# Patient Record
Sex: FEMALE | Race: WHITE | NOT HISPANIC OR LATINO | URBAN - METROPOLITAN AREA
[De-identification: names, ages, dates, MRNs, and addresses within clinical notes are randomized per-mention and may not be internally consistent; named-entity substitution may affect disease eponyms.]

---

## 2024-05-06 ENCOUNTER — INPATIENT (INPATIENT)
Facility: HOSPITAL | Age: 89
LOS: 2 days | Discharge: NURSING, REHAB OR LONG-TERM CARE | End: 2024-05-09
Attending: STUDENT IN AN ORGANIZED HEALTH CARE EDUCATION/TRAINING PROGRAM | Admitting: INTERNAL MEDICINE
Payer: MEDICARE

## 2024-05-06 VITALS
RESPIRATION RATE: 19 BRPM | SYSTOLIC BLOOD PRESSURE: 172 MMHG | OXYGEN SATURATION: 96 % | TEMPERATURE: 99 F | DIASTOLIC BLOOD PRESSURE: 64 MMHG | HEART RATE: 91 BPM

## 2024-05-06 DIAGNOSIS — E03.9 HYPOTHYROIDISM, UNSPECIFIED: ICD-10-CM

## 2024-05-06 DIAGNOSIS — E78.5 HYPERLIPIDEMIA, UNSPECIFIED: ICD-10-CM

## 2024-05-06 DIAGNOSIS — I10 ESSENTIAL (PRIMARY) HYPERTENSION: ICD-10-CM

## 2024-05-06 DIAGNOSIS — F03.90 UNSPECIFIED DEMENTIA WITHOUT BEHAVIORAL DISTURBANCE: ICD-10-CM

## 2024-05-06 DIAGNOSIS — G92.8 OTHER TOXIC ENCEPHALOPATHY: ICD-10-CM

## 2024-05-06 DIAGNOSIS — Z29.9 ENCOUNTER FOR PROPHYLACTIC MEASURES, UNSPECIFIED: ICD-10-CM

## 2024-05-06 DIAGNOSIS — F10.10 ALCOHOL ABUSE, UNCOMPLICATED: ICD-10-CM
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
## 2024-05-06 LAB
ADD ON TEST-SPECIMEN IN LAB: SIGNIFICANT CHANGE UP
ALBUMIN SERPL ELPH-MCNC: 3.2 G/DL — LOW (ref 3.3–5)
ALBUMIN SERPL ELPH-MCNC: 4.1 G/DL — SIGNIFICANT CHANGE UP (ref 3.3–5)
ALP SERPL-CCNC: 73 U/L — SIGNIFICANT CHANGE UP (ref 40–120)
ALP SERPL-CCNC: 95 U/L — SIGNIFICANT CHANGE UP (ref 40–120)
ALT FLD-CCNC: 11 U/L — SIGNIFICANT CHANGE UP (ref 10–45)
ALT FLD-CCNC: SIGNIFICANT CHANGE UP U/L (ref 10–45)
AMPHET UR-MCNC: NEGATIVE — SIGNIFICANT CHANGE UP
ANION GAP SERPL CALC-SCNC: 11 MMOL/L — SIGNIFICANT CHANGE UP (ref 5–17)
ANION GAP SERPL CALC-SCNC: 15 MMOL/L — SIGNIFICANT CHANGE UP (ref 5–17)
ANISOCYTOSIS BLD QL: SLIGHT — SIGNIFICANT CHANGE UP
APAP SERPL-MCNC: 15 UG/ML — SIGNIFICANT CHANGE UP (ref 10–30)
APPEARANCE UR: CLEAR — SIGNIFICANT CHANGE UP
APTT BLD: 25.2 SEC — SIGNIFICANT CHANGE UP (ref 24.5–35.6)
AST SERPL-CCNC: 28 U/L — SIGNIFICANT CHANGE UP (ref 10–40)
AST SERPL-CCNC: SIGNIFICANT CHANGE UP U/L (ref 10–40)
BACTERIA # UR AUTO: ABNORMAL /HPF
BARBITURATES UR SCN-MCNC: NEGATIVE — SIGNIFICANT CHANGE UP
BASOPHILS # BLD AUTO: 0 K/UL — SIGNIFICANT CHANGE UP (ref 0–0.2)
BASOPHILS NFR BLD AUTO: 0 % — SIGNIFICANT CHANGE UP (ref 0–2)
BENZODIAZ UR-MCNC: NEGATIVE — SIGNIFICANT CHANGE UP
BILIRUB SERPL-MCNC: 0.5 MG/DL — SIGNIFICANT CHANGE UP (ref 0.2–1.2)
BILIRUB SERPL-MCNC: 0.7 MG/DL — SIGNIFICANT CHANGE UP (ref 0.2–1.2)
BILIRUB UR-MCNC: NEGATIVE — SIGNIFICANT CHANGE UP
BUN SERPL-MCNC: 20 MG/DL — SIGNIFICANT CHANGE UP (ref 7–23)
BUN SERPL-MCNC: 21 MG/DL — SIGNIFICANT CHANGE UP (ref 7–23)
CALCIUM SERPL-MCNC: 8.1 MG/DL — LOW (ref 8.4–10.5)
CALCIUM SERPL-MCNC: 9.4 MG/DL — SIGNIFICANT CHANGE UP (ref 8.4–10.5)
CAST: 0 /LPF — SIGNIFICANT CHANGE UP (ref 0–4)
CHLORIDE SERPL-SCNC: 103 MMOL/L — SIGNIFICANT CHANGE UP (ref 96–108)
CHLORIDE SERPL-SCNC: 98 MMOL/L — SIGNIFICANT CHANGE UP (ref 96–108)
CK MB CFR SERPL CALC: 9.9 NG/ML — HIGH (ref 0–6.7)
CK SERPL-CCNC: 338 U/L — HIGH (ref 25–170)
CK SERPL-CCNC: 695 U/L — HIGH (ref 25–170)
CO2 SERPL-SCNC: 18 MMOL/L — LOW (ref 22–31)
CO2 SERPL-SCNC: 21 MMOL/L — LOW (ref 22–31)
COCAINE METAB.OTHER UR-MCNC: NEGATIVE — SIGNIFICANT CHANGE UP
COLOR SPEC: YELLOW — SIGNIFICANT CHANGE UP
CREAT SERPL-MCNC: 1.04 MG/DL — SIGNIFICANT CHANGE UP (ref 0.5–1.3)
CREAT SERPL-MCNC: 1.17 MG/DL — SIGNIFICANT CHANGE UP (ref 0.5–1.3)
DIFF PNL FLD: ABNORMAL
EGFR: 44 ML/MIN/1.73M2 — LOW
EGFR: 51 ML/MIN/1.73M2 — LOW
EOSINOPHIL # BLD AUTO: 0 K/UL — SIGNIFICANT CHANGE UP (ref 0–0.5)
EOSINOPHIL NFR BLD AUTO: 0 % — SIGNIFICANT CHANGE UP (ref 0–6)
GLUCOSE BLDC GLUCOMTR-MCNC: 82 MG/DL — SIGNIFICANT CHANGE UP (ref 70–99)
GLUCOSE BLDC GLUCOMTR-MCNC: 96 MG/DL — SIGNIFICANT CHANGE UP (ref 70–99)
GLUCOSE SERPL-MCNC: 111 MG/DL — HIGH (ref 70–99)
GLUCOSE SERPL-MCNC: 141 MG/DL — HIGH (ref 70–99)
GLUCOSE UR QL: 100 MG/DL
HCT VFR BLD CALC: 41.7 % — SIGNIFICANT CHANGE UP (ref 34.5–45)
HGB BLD-MCNC: 13.8 G/DL — SIGNIFICANT CHANGE UP (ref 11.5–15.5)
INR BLD: 0.98 — SIGNIFICANT CHANGE UP (ref 0.85–1.18)
KETONES UR-MCNC: 15 MG/DL
LACTATE SERPL-SCNC: 1.4 MMOL/L — SIGNIFICANT CHANGE UP (ref 0.5–2)
LACTATE SERPL-SCNC: 2.5 MMOL/L — HIGH (ref 0.5–2)
LACTATE SERPL-SCNC: 2.8 MMOL/L — HIGH (ref 0.5–2)
LEUKOCYTE ESTERASE UR-ACNC: NEGATIVE — SIGNIFICANT CHANGE UP
LYMPHOCYTES # BLD AUTO: 0.09 K/UL — LOW (ref 1–3.3)
LYMPHOCYTES # BLD AUTO: 0.8 % — LOW (ref 13–44)
MACROCYTES BLD QL: SLIGHT — SIGNIFICANT CHANGE UP
MAGNESIUM SERPL-MCNC: 1.6 MG/DL — SIGNIFICANT CHANGE UP (ref 1.6–2.6)
MANUAL SMEAR VERIFICATION: SIGNIFICANT CHANGE UP
MCHC RBC-ENTMCNC: 30.9 PG — SIGNIFICANT CHANGE UP (ref 27–34)
MCHC RBC-ENTMCNC: 33.1 GM/DL — SIGNIFICANT CHANGE UP (ref 32–36)
MCV RBC AUTO: 93.3 FL — SIGNIFICANT CHANGE UP (ref 80–100)
METHADONE UR-MCNC: NEGATIVE — SIGNIFICANT CHANGE UP
MICROCYTES BLD QL: SLIGHT — SIGNIFICANT CHANGE UP
MONOCYTES # BLD AUTO: 0.1 K/UL — SIGNIFICANT CHANGE UP (ref 0–0.9)
MONOCYTES NFR BLD AUTO: 0.9 % — LOW (ref 2–14)
NEUTROPHILS # BLD AUTO: 10.98 K/UL — HIGH (ref 1.8–7.4)
NEUTROPHILS NFR BLD AUTO: 96.5 % — HIGH (ref 43–77)
NEUTS BAND # BLD: 0.9 % — SIGNIFICANT CHANGE UP (ref 0–8)
NITRITE UR-MCNC: POSITIVE
OPIATES UR-MCNC: NEGATIVE — SIGNIFICANT CHANGE UP
OVALOCYTES BLD QL SMEAR: SLIGHT — SIGNIFICANT CHANGE UP
PCP SPEC-MCNC: SIGNIFICANT CHANGE UP
PCP UR-MCNC: NEGATIVE — SIGNIFICANT CHANGE UP
PH UR: 7.5 — SIGNIFICANT CHANGE UP (ref 5–8)
PHOSPHATE SERPL-MCNC: 3.4 MG/DL — SIGNIFICANT CHANGE UP (ref 2.5–4.5)
PLAT MORPH BLD: ABNORMAL
PLATELET # BLD AUTO: 169 K/UL — SIGNIFICANT CHANGE UP (ref 150–400)
POLYCHROMASIA BLD QL SMEAR: SLIGHT — SIGNIFICANT CHANGE UP
POTASSIUM SERPL-MCNC: 4.2 MMOL/L — SIGNIFICANT CHANGE UP (ref 3.5–5.3)
POTASSIUM SERPL-MCNC: 4.4 MMOL/L — SIGNIFICANT CHANGE UP (ref 3.5–5.3)
POTASSIUM SERPL-MCNC: SIGNIFICANT CHANGE UP MMOL/L (ref 3.5–5.3)
POTASSIUM SERPL-SCNC: 4.2 MMOL/L — SIGNIFICANT CHANGE UP (ref 3.5–5.3)
POTASSIUM SERPL-SCNC: 4.4 MMOL/L — SIGNIFICANT CHANGE UP (ref 3.5–5.3)
POTASSIUM SERPL-SCNC: SIGNIFICANT CHANGE UP MMOL/L (ref 3.5–5.3)
PROCALCITONIN SERPL-MCNC: 0.05 NG/ML — SIGNIFICANT CHANGE UP (ref 0.02–0.1)
PROT SERPL-MCNC: 5.3 G/DL — LOW (ref 6–8.3)
PROT SERPL-MCNC: 6.8 G/DL — SIGNIFICANT CHANGE UP (ref 6–8.3)
PROT UR-MCNC: 300 MG/DL
PROTHROM AB SERPL-ACNC: 11.2 SEC — SIGNIFICANT CHANGE UP (ref 9.5–13)
RBC # BLD: 4.47 M/UL — SIGNIFICANT CHANGE UP (ref 3.8–5.2)
RBC # FLD: 12.6 % — SIGNIFICANT CHANGE UP (ref 10.3–14.5)
RBC BLD AUTO: ABNORMAL
RBC CASTS # UR COMP ASSIST: 4 /HPF — SIGNIFICANT CHANGE UP (ref 0–4)
SALICYLATES SERPL-MCNC: <0.3 MG/DL — LOW (ref 2.8–20)
SODIUM SERPL-SCNC: 132 MMOL/L — LOW (ref 135–145)
SODIUM SERPL-SCNC: 134 MMOL/L — LOW (ref 135–145)
SP GR SPEC: 1.02 — SIGNIFICANT CHANGE UP (ref 1–1.03)
SPHEROCYTES BLD QL SMEAR: SLIGHT — SIGNIFICANT CHANGE UP
SQUAMOUS # UR AUTO: 0 /HPF — SIGNIFICANT CHANGE UP (ref 0–5)
THC UR QL: NEGATIVE — SIGNIFICANT CHANGE UP
TROPONIN T, HIGH SENSITIVITY RESULT: 51 NG/L — SIGNIFICANT CHANGE UP (ref 0–51)
TROPONIN T, HIGH SENSITIVITY RESULT: 70 NG/L — CRITICAL HIGH (ref 0–51)
TSH SERPL-MCNC: 1.34 UIU/ML — SIGNIFICANT CHANGE UP (ref 0.27–4.2)
UROBILINOGEN FLD QL: 1 MG/DL — SIGNIFICANT CHANGE UP (ref 0.2–1)
VARIANT LYMPHS # BLD: 0.9 % — SIGNIFICANT CHANGE UP (ref 0–6)
WBC # BLD: 11.27 K/UL — HIGH (ref 3.8–10.5)
WBC # FLD AUTO: 11.27 K/UL — HIGH (ref 3.8–10.5)
WBC UR QL: 2 /HPF — SIGNIFICANT CHANGE UP (ref 0–5)

## 2024-05-06 PROCEDURE — 70450 CT HEAD/BRAIN W/O DYE: CPT | Mod: 26,MC

## 2024-05-06 PROCEDURE — 99285 EMERGENCY DEPT VISIT HI MDM: CPT

## 2024-05-06 PROCEDURE — 93010 ELECTROCARDIOGRAM REPORT: CPT

## 2024-05-06 PROCEDURE — 71045 X-RAY EXAM CHEST 1 VIEW: CPT | Mod: 26

## 2024-05-06 PROCEDURE — 72125 CT NECK SPINE W/O DYE: CPT | Mod: 26,MC

## 2024-05-06 PROCEDURE — 99223 1ST HOSP IP/OBS HIGH 75: CPT | Mod: GC

## 2024-05-06 RX ORDER — CEFTRIAXONE 500 MG/1
1000 INJECTION, POWDER, FOR SOLUTION INTRAMUSCULAR; INTRAVENOUS ONCE
Refills: 0 | Status: COMPLETED | OUTPATIENT
Start: 2024-05-06 | End: 2024-05-06

## 2024-05-06 RX ORDER — SIMVASTATIN 20 MG/1
1 TABLET, FILM COATED ORAL
Refills: 0 | DISCHARGE

## 2024-05-06 RX ORDER — THIAMINE MONONITRATE (VIT B1) 100 MG
500 TABLET ORAL EVERY 8 HOURS
Refills: 0 | Status: DISCONTINUED | OUTPATIENT
Start: 2024-05-06 | End: 2024-05-08

## 2024-05-06 RX ORDER — HEPARIN SODIUM 5000 [USP'U]/ML
5000 INJECTION INTRAVENOUS; SUBCUTANEOUS EVERY 8 HOURS
Refills: 0 | Status: DISCONTINUED | OUTPATIENT
Start: 2024-05-06 | End: 2024-05-09

## 2024-05-06 RX ORDER — SODIUM CHLORIDE 9 MG/ML
600 INJECTION INTRAMUSCULAR; INTRAVENOUS; SUBCUTANEOUS
Refills: 0 | Status: DISCONTINUED | OUTPATIENT
Start: 2024-05-06 | End: 2024-05-07

## 2024-05-06 RX ORDER — ACETAMINOPHEN 500 MG
1000 TABLET ORAL ONCE
Refills: 0 | Status: COMPLETED | OUTPATIENT
Start: 2024-05-06 | End: 2024-05-06

## 2024-05-06 RX ORDER — ATENOLOL 25 MG/1
1 TABLET ORAL
Refills: 0 | DISCHARGE

## 2024-05-06 RX ORDER — POTASSIUM CHLORIDE 20 MEQ
10 PACKET (EA) ORAL ONCE
Refills: 0 | Status: DISCONTINUED | OUTPATIENT
Start: 2024-05-06 | End: 2024-05-06

## 2024-05-06 RX ORDER — SODIUM CHLORIDE 9 MG/ML
1000 INJECTION, SOLUTION INTRAVENOUS
Refills: 0 | Status: DISCONTINUED | OUTPATIENT
Start: 2024-05-06 | End: 2024-05-09

## 2024-05-06 RX ORDER — SODIUM CHLORIDE 9 MG/ML
1000 INJECTION INTRAMUSCULAR; INTRAVENOUS; SUBCUTANEOUS ONCE
Refills: 0 | Status: COMPLETED | OUTPATIENT
Start: 2024-05-06 | End: 2024-05-06

## 2024-05-06 RX ORDER — INSULIN LISPRO 100/ML
VIAL (ML) SUBCUTANEOUS
Refills: 0 | Status: DISCONTINUED | OUTPATIENT
Start: 2024-05-06 | End: 2024-05-09

## 2024-05-06 RX ORDER — DEXTROSE 50 % IN WATER 50 %
25 SYRINGE (ML) INTRAVENOUS ONCE
Refills: 0 | Status: DISCONTINUED | OUTPATIENT
Start: 2024-05-06 | End: 2024-05-09

## 2024-05-06 RX ORDER — DEXTROSE 10 % IN WATER 10 %
125 INTRAVENOUS SOLUTION INTRAVENOUS ONCE
Refills: 0 | Status: DISCONTINUED | OUTPATIENT
Start: 2024-05-06 | End: 2024-05-09

## 2024-05-06 RX ORDER — LEVOTHYROXINE SODIUM 125 MCG
1 TABLET ORAL
Refills: 0 | DISCHARGE

## 2024-05-06 RX ORDER — DEXTROSE 50 % IN WATER 50 %
15 SYRINGE (ML) INTRAVENOUS ONCE
Refills: 0 | Status: DISCONTINUED | OUTPATIENT
Start: 2024-05-06 | End: 2024-05-09

## 2024-05-06 RX ORDER — LOSARTAN/HYDROCHLOROTHIAZIDE 100MG-25MG
1 TABLET ORAL
Refills: 0 | DISCHARGE

## 2024-05-06 RX ORDER — GLUCAGON INJECTION, SOLUTION 0.5 MG/.1ML
1 INJECTION, SOLUTION SUBCUTANEOUS ONCE
Refills: 0 | Status: DISCONTINUED | OUTPATIENT
Start: 2024-05-06 | End: 2024-05-09

## 2024-05-06 RX ORDER — LEVOTHYROXINE SODIUM 125 MCG
80 TABLET ORAL AT BEDTIME
Refills: 0 | Status: DISCONTINUED | OUTPATIENT
Start: 2024-05-06 | End: 2024-05-07

## 2024-05-06 RX ORDER — INSULIN LISPRO 100/ML
VIAL (ML) SUBCUTANEOUS AT BEDTIME
Refills: 0 | Status: DISCONTINUED | OUTPATIENT
Start: 2024-05-06 | End: 2024-05-09

## 2024-05-06 RX ORDER — DEXTROSE 50 % IN WATER 50 %
12.5 SYRINGE (ML) INTRAVENOUS ONCE
Refills: 0 | Status: DISCONTINUED | OUTPATIENT
Start: 2024-05-06 | End: 2024-05-09

## 2024-05-06 RX ADMIN — SODIUM CHLORIDE 75 MILLILITER(S): 9 INJECTION INTRAMUSCULAR; INTRAVENOUS; SUBCUTANEOUS at 16:59

## 2024-05-06 RX ADMIN — SODIUM CHLORIDE 1000 MILLILITER(S): 9 INJECTION INTRAMUSCULAR; INTRAVENOUS; SUBCUTANEOUS at 11:59

## 2024-05-06 RX ADMIN — Medication 80 MICROGRAM(S): at 21:34

## 2024-05-06 RX ADMIN — CEFTRIAXONE 100 MILLIGRAM(S): 500 INJECTION, POWDER, FOR SOLUTION INTRAMUSCULAR; INTRAVENOUS at 11:59

## 2024-05-06 RX ADMIN — SODIUM CHLORIDE 1000 MILLILITER(S): 9 INJECTION INTRAMUSCULAR; INTRAVENOUS; SUBCUTANEOUS at 12:00

## 2024-05-06 RX ADMIN — HEPARIN SODIUM 5000 UNIT(S): 5000 INJECTION INTRAVENOUS; SUBCUTANEOUS at 21:29

## 2024-05-06 RX ADMIN — Medication 105 MILLIGRAM(S): at 17:40

## 2024-05-06 RX ADMIN — Medication 400 MILLIGRAM(S): at 12:36

## 2024-05-06 NOTE — H&P ADULT - HISTORY OF PRESENT ILLNESS
89F w/ PMHx of Dementia, hypothyroidism, HTN, HLD was BIBEMS after being found down, admitted for toxic metabolic encephalopathy.     ED Course:  Vitals: 99.9 F, HR 91, /64, RR 19 (96%) on Room air  Labs: WBC 11.27, Neutrophils 96%, Na 134, BUN 21, Glucose 141, Lactate 2.5, , U/A + bacteria, leuk esterase, 2 WBC, protein, ketones,   Imaging: CXR unremarkable. CTH Parenchymal volume loss and chronic microvessel ischemic changes are identified, Dilated lateral and third ventricles are seen with a normal-appearing fourth ventricle. 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 Collateral obtained from her POA (Armaan Machuca Jr , Esq [phone]) and PCP Dr. Perry ([phone]). Patient had a telephonic visit this past weekend with her PCP and at that time, she was at her baseline (AAOx2-3 and functional). She subsequently had a fall at sometime on 05/05 and was found down by her assistant this morning, which prompted the ED visit. Per POA, patient has recently become more confused and often repeats the same stories for the past few weeks. Of note, the assistant has noticed multiple empty wine bottles in the garbage. Per POA, she ordered 12 bottles of wine x2 in the past 2 weeks. No known history of withdrawal, DT, or seizures. ROS limited by clinical status.    ED Course:  Vitals: 99.9 F, HR 91, /64, RR 19 (96%) on Room air  Labs: WBC 11.27, Neutrophils 96%, Na 134, BUN 21, Glucose 141, Lactate 2.5, , U/A + bacteria, leuk esterase, 2 WBC, protein, ketones,   Imaging: CXR unremarkable. CTH Parenchymal volume loss and chronic microvessel ischemic changes are identified, Dilated lateral and third ventricles are seen with a normal-appearing fourth ventricle. 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

## 2024-05-06 NOTE — H&P ADULT - PROBLEM SELECTOR PLAN 1
Likely 2/2 sepsis from ?UTI vs. NPH vs. ETOH abuse vs. metabolic derangements.  - Obtain TSH, B12, Ammonia, Tylenol, ASA levels  - Obtain Utox Likely 2/2 sepsis from ?UTI vs. NPH vs. ETOH abuse vs. metabolic derangements. S/p CTX in ED   - Obtain TSH, B12, Ammonia, Tylenol, ASA levels  - Obtain Utox Presents after being found down over the weekend, found this morning. On admission, did not meet SIRS criteria. U/A + bacteria, leuk esterase, 2 WBC, protein, ketones. . S/p CTX and 2L NS. Lactate 2.5. Likely 2/2 ?UTI vs. ETOH abuse vs. progressive dementia vs. NPH vs. metabolic derangements. 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 CT cervical - degenerative changes  - Obtain Neuro consult  - Obtain Utox, TSH, B12, Ammonia, Tylenol, ASA levels  - C/w CTX 2g x 3 days total (-05/08)  - F/u BCx and UCx Presents after being found down over the weekend, found this morning. On admission, did not meet SIRS criteria. U/A + bacteria, leuk esterase, 2 WBC, protein, ketones. . S/p CTX and 2L NS. Lactate 2.5. Likely 2/2 ETOH abuse vs. progressive dementia vs. NPH vs. metabolic derangements. 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 CT cervical - degenerative changes  - Obtain Neuro consult  - Obtain Utox, TSH, B12, Ammonia, Tylenol, ASA levels  - Monitor off Abx as U/A with bacteria   - F/u BCx and UCx Presents after being found down over the weekend, found this morning. On admission, did not meet SIRS criteria. U/A + bacteria, leuk esterase, 2 WBC, protein, ketones. . S/p CTX and 2L NS. Lactate 2.5. Likely 2/2 ETOH abuse vs. progressive dementia vs. NPH vs. metabolic derangements. 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 CT cervical - degenerative changes  - Obtain Neuro consult  - Obtain Utox, TSH, B12, Ammonia, Tylenol, ASA levels  - Monitor off Abx as U/A with bacteria   - F/u BCx and UCx  - Obtain vEEG to rule out seizures  - Obtain MRI w/w/o contrast Presents after being found down over the weekend, found this morning. On admission, did not meet SIRS criteria. U/A + bacteria, leuk esterase, 2 WBC, protein, ketones. . S/p CTX and 2L NS. Lactate 2.5. Likely 2/2 ETOH abuse vs. progressive dementia vs. NPH vs. metabolic derangements. 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 CT cervical - degenerative changes  - Obtain Neuro consult, appreciate recs  - Obtain Utox, TSH, B12, Ammonia, Tylenol, ASA levels  - Monitor off Abx as U/A without pyruria  - F/u BCx and UCx  - Obtain vEEG to rule out seizures  - Obtain MRI w/w/o contrast

## 2024-05-06 NOTE — H&P ADULT - PROBLEM SELECTOR PLAN 6
Plan:  F: s/p 2L NS in the ED   E: replete K<4, Mg<2  N: NPO  VTE Prophylaxis: Heparin SubQ  C: Full Code  D: KAYLA Known history of hypothyroidism. Per Surescripts, on Synthroid 100mcg  - C/w Synthroid 100mcg Known history of hypothyroidism. On home Synthroid 100mcg  - C/w Synthroid 100mcg Known history of HLD. On home Simvastatin 40mg  - C/w therapeutic interchange Lipitor

## 2024-05-06 NOTE — ED PROVIDER NOTE - PHYSICAL EXAMINATION
VITAL SIGNS: I have reviewed nursing notes and confirm.  CONSTITUTIONAL: weak appearing  SKIN: abrasion left face  HEAD: Normocephalic; atraumatic.  EYES: PERRL, EOM intact; conjunctiva and sclera clear.  ENT: No nasal discharge; airway clear.  NECK: Supple; non tender.  CARD: S1, S2 normal; no murmurs, gallops, or rubs. Regular rate and rhythm.  RESP: No wheezes, rales or rhonchi.  ABD: Normal bowel sounds; soft; non-distended; non-tender; no hepatosplenomegaly.  EXT: Normal ROM. No clubbing, cyanosis or edema.  LYMPH: No acute cervical adenopathy.  NEURO: Awake and alert, moving all ext, not following commands  PSYCH: Cooperative

## 2024-05-06 NOTE — ED ADULT NURSE NOTE - NSFALLHARMRISKINTERV_ED_ALL_ED

## 2024-05-06 NOTE — ED ADULT NURSE NOTE - OBJECTIVE STATEMENT
89yoF PMH HTN, HLD, BIBEMS c/o AMS. HHA states she came today and found patient on floor covered in Urine. Bruise noted around L eye. Pt arrived AOX0, non-verbal, responds to pain. A states pt is normally AOX4, able to take care of herself at home. Last seen on Friday. Pt cleaned, EKG performed, pt placed on CCM. Pt upgraded to MD Zarate. MD at bedside to assess the patient. IV placed, labs sent as ordered. Rectal temp checked.

## 2024-05-06 NOTE — H&P ADULT - PROBLEM SELECTOR PLAN 3
On admission, presented with /64. Repeat 120/60. Per Surescript, on home HCTZ-Losartan 100mg-12.5mg, Atenolol 50mg, and Lasix 40mg PO   - c/w home HCTZ-Losartan 100mg-12.5mg and Atenolol 50mg   - Hold Lasix 40mg PO for now as patient is currently euvolemic and script was filled remotely On admission, presented with /64. Repeat 120/60. On home HCTZ-Losartan 100mg-12.5mg, Atenolol 50mg, and Lasix 40mg PO   - c/w home HCTZ-Losartan 100mg-12.5mg and Atenolol 50mg   - Hold Lasix 40mg PO for now as patient is currently euvolemic, restart as clinically indicated Patient with history of multiple falls in the past few months. Patient was found down after a suspected fall this weekend. Last known well on Saturday.  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 CT cervical - degenerative changes. Likely 2/2 progressive dementia vs. ETOH use. vs. NPH.   - PT consult  - Obtain orthostatics  - Pain management: Tylenol 650mg q6 IV PRN, lidocaine patch  - Weight bearing/Fall precautions

## 2024-05-06 NOTE — H&P ADULT - PROBLEM SELECTOR PLAN 7
Plan:  F: s/p 2L NS in the ED   E: replete K<4, Mg<2  N: NPO  VTE Prophylaxis: Heparin SubQ  C: Full Code  D: KAYLA Known history of hypothyroidism. On home Synthroid 100mcg  - C/w Synthroid 100mcg

## 2024-05-06 NOTE — H&P ADULT - PROBLEM SELECTOR PLAN 8
Plan:  F: s/p 2L NS in the ED   E: replete K<4, Mg<2  N: NPO  VTE Prophylaxis: Heparin SubQ  C: Full Code  D: KAYLA

## 2024-05-06 NOTE — H&P ADULT - NSHPSOCIALHISTORY_GEN_ALL_CORE
Lives alone in an apartment. Has assistants that visit her periodically. Recent significant ETOH use (per POA, she ordered 2 crates of wine (12 bottles each) within the last 2 weeks). No HHA.

## 2024-05-06 NOTE — H&P ADULT - PROBLEM SELECTOR PLAN 2
HCTZ-Losartan 100-12.5mg. Lasix 40mg PO. Atenolol 50mg Patient with recent increase in ETOH consumption per POA. Home frequently seen to have empty bottles of ETOH in her garbage during routine check-ins. Furthermore, she may be masking amount of ETOH consumption from assistant personel who routinely checks on her. No known history of DTs or seizures.   - CIWA cannot be performed due to patient's current mental status.  - Last drink: unknown  - c/w CIWA protocol, ativan 2mg PRN for CIWA > 8 p  - give thiamine 909m5cod for 3 days, then 250mg daily  - multivitamin and folic acid daily when able to tolerate PO  - monitor EKG with QT prolonging meds  - fall precautions

## 2024-05-06 NOTE — CONSULT NOTE ADULT - ATTENDING COMMENTS
89-year-old woman with history of documented alcohol misuse admitted with altered mental status in the setting of a possible UTI treated with antibiotics with improving mental status.  CT head shows nonspecific periventricular hypodensity likely cerebral small vessel disease with global atrophy and ex vacuo dilation of ventricles, unlikely communicating hydrocephalus.  EEG demonstrated triphasic slowing with improvement.  MRI brain recommended but can likely be done on an outpatient basis with neurology follow-up.  Screening B12, homocystine, methylmalonic acid B1, TSH, RPR and HIV recommended.

## 2024-05-06 NOTE — H&P ADULT - NSHPPHYSICALEXAM_GEN_ALL_CORE
T(C): 37.7 (05-06-24 @ 10:30), Max: 37.7 (05-06-24 @ 10:30)  HR: 77 (05-06-24 @ 11:30) (77 - 91)  BP: 127/70 (05-06-24 @ 11:30) (127/70 - 172/64)  RR: 18 (05-06-24 @ 11:30) (18 - 19)  SpO2: 96% (05-06-24 @ 11:30) (96% - 96%)    General: NAD, laying in bed, speaking in full sentences  HEENT: head NC/AT, no conjunctival injection, EOMI, MMM  Neck: supple, no JVD  Cardio: RRR, +S1/S2, no M/R/G  Resp: lungs CTAB, no cough/wheezes/rales/rhonchi  Abdo: soft, NT, ND, +bowel sounds x4, no organomegaly or palpable mass    Extremities: WWP, no edema/cyanosis/clubbing   Vasc: 2+ radial and DP pulses b/l  Neuro: A&Ox3  Psych: speech non-pressured, thoughts goal-oriented  Skin: dry, intact, no visible jaundice   MSK: no joint swelling T(C): 37.7 (05-06-24 @ 10:30), Max: 37.7 (05-06-24 @ 10:30)  HR: 77 (05-06-24 @ 11:30) (77 - 91)  BP: 127/70 (05-06-24 @ 11:30) (127/70 - 172/64)  RR: 18 (05-06-24 @ 11:30) (18 - 19)  SpO2: 96% (05-06-24 @ 11:30) (96% - 96%)    General: NAD, laying in bed,   HEENT: head NC/AT, no conjunctival injection, EOMI, MMM  Neck: supple, no JVD  Cardio: RRR, +S1/S2, no M/R/G  Resp: lungs CTAB, no cough/wheezes/rales/rhonchi  Abdo: soft, NT, ND, +bowel sounds x4, no organomegaly or palpable mass    Extremities: WWP, no edema/cyanosis/clubbing   Vasc: 2+ radial and DP pulses b/l  Neuro: A&Ox3  Psych: speech non-pressured, thoughts goal-oriented  Skin: dry, intact, no visible jaundice   MSK: no joint swelling T(C): 37.7 (05-06-24 @ 10:30), Max: 37.7 (05-06-24 @ 10:30)  HR: 77 (05-06-24 @ 11:30) (77 - 91)  BP: 127/70 (05-06-24 @ 11:30) (127/70 - 172/64)  RR: 18 (05-06-24 @ 11:30) (18 - 19)  SpO2: 96% (05-06-24 @ 11:30) (96% - 96%)    General: NAD, laying in bed,   HEENT: head NC/AT, no conjunctival injection, EOMI, MMM  Neck: supple, no JVD  Cardio: RRR, +S1/S2, no M/R/G  Resp: lungs CTAB, no cough/wheezes/rales/rhonchi, on room air  Abdo: soft, NT, ND, +bowel sounds x4, no organomegaly or palpable mass    Extremities: +thin LE with vascular changes  Vasc: 2+ radial and DP pulses b/l  Neuro: A&Ox0, opens eyes but unable to track movement meaningfully. Spontaneously moves all extremities and winces to noxious stimuli  Psych: speech non-pressured, thoughts goal-oriented  Skin: dry, intact, no visible jaundice   MSK: no joint swelling

## 2024-05-06 NOTE — H&P ADULT - ASSESSMENT
89F w/ PMHx of Dementia c/b frequent falls, hypothyroidism, HTN, HLD was BIBEMS after being found down, admitted for toxic metabolic encephalopathy.

## 2024-05-06 NOTE — ED ADULT TRIAGE NOTE - CHIEF COMPLAINT QUOTE
Pt BIBEMS from home. Per EMS, pt was found down by  today. Per EMS,  last saw patient on Saturday and reports pt is typically A&Ox4. Pt nonverbal in triage with bruising to left cheek. No other obvious deformities noted. EMS reports pt was found laying in urine. Hx HTN, HLD, hypothyroidism. Upgraded to MD Zarate. Pt BIBEMS from home. Per EMS, pt was found down by  today. Per EMS,  last saw patient on Saturday and reports pt is typically A&Ox4 and lives at home alone. Pt nonverbal in triage with bruising to left cheek. No other obvious deformities noted. EMS reports pt was found laying in urine. Hx HTN, HLD, hypothyroidism. Upgraded to MD Zarate. Pt BIBEMS from home. Per EMS, pt was found down by  today. Per EMS,  last saw patient on Saturday and reports pt is typically A&Ox4 and lives at home alone. Pt nonverbal in triage and unable to follow commands. Pt has bruising to left cheek. No other obvious deformities noted. EMS reports pt was found laying in urine. Hx HTN, HLD, hypothyroidism. Upgraded to MD Zarate.

## 2024-05-06 NOTE — ED ADULT NURSE NOTE - CHIEF COMPLAINT QUOTE
Pt BIBEMS from home. Per EMS, pt was found down by  today. Per EMS,  last saw patient on Saturday and reports pt is typically A&Ox4 and lives at home alone. Pt nonverbal in triage and unable to follow commands. Pt has bruising to left cheek. No other obvious deformities noted. EMS reports pt was found laying in urine. Hx HTN, HLD, hypothyroidism. Upgraded to MD Zarate.

## 2024-05-06 NOTE — PATIENT PROFILE ADULT - TRANSPORTATION
Pt reports swelling and white coating to tongue since completing antibiotics  Med to pharmacy    
no

## 2024-05-06 NOTE — ED PROVIDER NOTE - OBJECTIVE STATEMENT
89 y/o f with PMH of HTN, HLD, hypothyroid presents to ED with altered mental status.  As per  and  at bedside, pt was last normal day prior to presentation.   found pt on floor this morning.  Pt awake and alert but not able to answer most questions and follow commands.  Pt also has power of  and papers faxed.  No known AC.  Pt normally lives by herself and cares for self.   has noticed some mild dementia

## 2024-05-06 NOTE — H&P ADULT - PROBLEM SELECTOR PLAN 5
Known history of hypothyroidism. Per Surescripts, on Synthroid 100mcg  - C/w Synthroid 100mcg Known history of HLD. Per Surescripts, on Simvastatin 40mg  - C/w therapeutic interchange Lipitor Known history of HLD. On home Simvastatin 40mg  - C/w therapeutic interchange Lipitor Known history of dementia. Per POA (Armaan Machuca Jr), patient is conversive and can ambulate, however has slowed movements and takes time to execute plans (going to the bathroom, kitchen, etc). Recently, she has become more forgetful  - Aspiration and fall precautions  - Speech and Swallow evaluation  -  consult for HHA

## 2024-05-06 NOTE — H&P ADULT - ATTENDING COMMENTS
Pt is 89 yo woman with history of AUD, falls, dementia, admitted after being found down at her apartment. Pt CT head showed cortical atrophy and enlarged ventricles. Pt was hemodynamically stable on admission. Noted to have mild elevation of CPKs. On PE pt was alert but disoriented, not being able to follow commands. Noted to have rigidity in the upper extremities. Pupils are reactive. Cardiopulmonary exam is unremarkable. Abdomen is soft with diminished BS.   Impression: AMS, might be due to subacute stroke, vs alcohol withdrawal, vs seizures vs metabolic encephalopathy  ----neuro consult  ----MRI brain, might consider LP if other work up is non-diagnostic  ----thiamine supplementation IV  ----Prolactin levels and EEG  ----GOC conversation with HCP  ----withdrawal precautions

## 2024-05-06 NOTE — PATIENT PROFILE ADULT - FALL HARM RISK - HARM RISK INTERVENTIONS
Assistance with ambulation/Assistance OOB with selected safe patient handling equipment/Communicate Risk of Fall with Harm to all staff/Discuss with provider need for PT consult/Monitor for mental status changes/Monitor gait and stability/Move patient closer to nurses' station/Reinforce activity limits and safety measures with patient and family/Reorient to person, place and time as needed/Tailored Fall Risk Interventions/Toileting schedule using arm’s reach rule for commode and bathroom/Use of alarms - bed, chair and/or voice tab/Visual Cue: Yellow wristband and red socks/Bed in lowest position, wheels locked, appropriate side rails in place/Call bell, personal items and telephone in reach/Instruct patient to call for assistance before getting out of bed or chair/Non-slip footwear when patient is out of bed/Joy to call system/Physically safe environment - no spills, clutter or unnecessary equipment/Purposeful Proactive Rounding/Room/bathroom lighting operational, light cord in reach

## 2024-05-06 NOTE — ED PROVIDER NOTE - CLINICAL SUMMARY MEDICAL DECISION MAKING FREE TEXT BOX
89 y/o f with PMH of HTN, HLD, hypothyroid presents to ED with altered mental status.  As per  and  at bedside, pt was last normal day prior to presentation.   found pt on floor this morning.  Pt awake and alert but not able to answer most questions and follow commands.  Pt also has power of  and papers faxed.  No known AC.  Pt normally lives by herself and cares for self.   has noticed some mild dementia    VS - temp 99.9  Labs - wbc 11, UA positive for UTI  Treated with fluids - lactate 2.5, IV ceft in ED  TO be admitted for ams, UTI and further mgmt

## 2024-05-06 NOTE — H&P ADULT - PROBLEM SELECTOR PLAN 4
Known history of HLD. Per Surescripts, on Simvastatin 40mg  - C/w therapeutic interchange Lipitor Known history of dementia. Per POA (Armaan Machuca Jr), patient is conversive and can ambulate, however has slowed movements and takes time to execute plans (going to the bathroom, kitchen, etc). Recently, she has become more forgetful  - Aspiration and fall precautions  - Speech and Swallow evaluation  -  consult for HHA On admission, presented with /64. Repeat 120/60. On home HCTZ-Losartan 100mg-12.5mg, Atenolol 50mg, and Lasix 40mg PO   - c/w home HCTZ-Losartan 100mg-12.5mg and Atenolol 50mg   - Hold Lasix 40mg PO for now as patient is currently euvolemic, restart as clinically indicated

## 2024-05-06 NOTE — H&P ADULT - NSHPLABSRESULTS_GEN_ALL_CORE
LABS:                        13.8   11.27 )-----------( 169      ( 06 May 2024 10:18 )             41.7     05-    134<L>  |  98  |  20  ----------------------------<  141<H>  see note   |  21<L>  |  1.04    Ca    9.4      06 May 2024 10:18    TPro  6.8  /  Alb  4.1  /  TBili  0.7  /  DBili  x   /  AST  see note  /  ALT  see note  /  AlkPhos  95  -06    PT/INR - ( 06 May 2024 10:18 )   PT: 11.2 sec;   INR: 0.98          PTT - ( 06 May 2024 10:18 )  PTT:25.2 sec  Urinalysis Basic - ( 06 May 2024 10:18 )    Color: Yellow / Appearance: Clear / S.016 / pH: x  Gluc: 141 mg/dL / Ketone: 15 mg/dL  / Bili: Negative / Urobili: 1.0 mg/dL   Blood: x / Protein: 300 mg/dL / Nitrite: Positive   Leuk Esterase: Negative / RBC: 4 /HPF / WBC 2 /HPF   Sq Epi: x / Non Sq Epi: 0 /HPF / Bacteria: Many /HPF        MICROBIOLOGY:    RADIOLOGY & ADDITIONAL STUDIES:

## 2024-05-07 DIAGNOSIS — G93.41 METABOLIC ENCEPHALOPATHY: ICD-10-CM

## 2024-05-07 DIAGNOSIS — R79.89 OTHER SPECIFIED ABNORMAL FINDINGS OF BLOOD CHEMISTRY: ICD-10-CM

## 2024-05-07 DIAGNOSIS — N17.9 ACUTE KIDNEY FAILURE, UNSPECIFIED: ICD-10-CM

## 2024-05-07 LAB
ALBUMIN SERPL ELPH-MCNC: 3.5 G/DL — SIGNIFICANT CHANGE UP (ref 3.3–5)
ALP SERPL-CCNC: 80 U/L — SIGNIFICANT CHANGE UP (ref 40–120)
ALT FLD-CCNC: 15 U/L — SIGNIFICANT CHANGE UP (ref 10–45)
ANION GAP SERPL CALC-SCNC: 12 MMOL/L — SIGNIFICANT CHANGE UP (ref 5–17)
APPEARANCE UR: CLEAR — SIGNIFICANT CHANGE UP
APTT BLD: 29 SEC — SIGNIFICANT CHANGE UP (ref 24.5–35.6)
AST SERPL-CCNC: 38 U/L — SIGNIFICANT CHANGE UP (ref 10–40)
BACTERIA # UR AUTO: NEGATIVE /HPF — SIGNIFICANT CHANGE UP
BASOPHILS # BLD AUTO: 0.03 K/UL — SIGNIFICANT CHANGE UP (ref 0–0.2)
BASOPHILS NFR BLD AUTO: 0.3 % — SIGNIFICANT CHANGE UP (ref 0–2)
BILIRUB SERPL-MCNC: 0.7 MG/DL — SIGNIFICANT CHANGE UP (ref 0.2–1.2)
BILIRUB UR-MCNC: NEGATIVE — SIGNIFICANT CHANGE UP
BLD GP AB SCN SERPL QL: NEGATIVE — SIGNIFICANT CHANGE UP
BUN SERPL-MCNC: 23 MG/DL — SIGNIFICANT CHANGE UP (ref 7–23)
CALCIUM SERPL-MCNC: 8.8 MG/DL — SIGNIFICANT CHANGE UP (ref 8.4–10.5)
CAST: 1 /LPF — SIGNIFICANT CHANGE UP (ref 0–4)
CHLORIDE SERPL-SCNC: 107 MMOL/L — SIGNIFICANT CHANGE UP (ref 96–108)
CK MB CFR SERPL CALC: 16.2 NG/ML — HIGH (ref 0–6.7)
CK MB CFR SERPL CALC: 16.3 NG/ML — HIGH (ref 0–6.7)
CK SERPL-CCNC: 854 U/L — HIGH (ref 25–170)
CK SERPL-CCNC: 876 U/L — HIGH (ref 25–170)
CO2 SERPL-SCNC: 19 MMOL/L — LOW (ref 22–31)
COLOR SPEC: YELLOW — SIGNIFICANT CHANGE UP
CREAT ?TM UR-MCNC: 45 MG/DL — SIGNIFICANT CHANGE UP
CREAT SERPL-MCNC: 1.37 MG/DL — HIGH (ref 0.5–1.3)
DIFF PNL FLD: NEGATIVE — SIGNIFICANT CHANGE UP
EGFR: 36 ML/MIN/1.73M2 — LOW
EOSINOPHIL # BLD AUTO: 0.02 K/UL — SIGNIFICANT CHANGE UP (ref 0–0.5)
EOSINOPHIL NFR BLD AUTO: 0.2 % — SIGNIFICANT CHANGE UP (ref 0–6)
GLUCOSE BLDC GLUCOMTR-MCNC: 113 MG/DL — HIGH (ref 70–99)
GLUCOSE BLDC GLUCOMTR-MCNC: 117 MG/DL — HIGH (ref 70–99)
GLUCOSE BLDC GLUCOMTR-MCNC: 142 MG/DL — HIGH (ref 70–99)
GLUCOSE BLDC GLUCOMTR-MCNC: 74 MG/DL — SIGNIFICANT CHANGE UP (ref 70–99)
GLUCOSE SERPL-MCNC: 79 MG/DL — SIGNIFICANT CHANGE UP (ref 70–99)
GLUCOSE UR QL: NEGATIVE MG/DL — SIGNIFICANT CHANGE UP
HCT VFR BLD CALC: 38.5 % — SIGNIFICANT CHANGE UP (ref 34.5–45)
HGB BLD-MCNC: 12.5 G/DL — SIGNIFICANT CHANGE UP (ref 11.5–15.5)
IMM GRANULOCYTES NFR BLD AUTO: 0.2 % — SIGNIFICANT CHANGE UP (ref 0–0.9)
INR BLD: 0.9 — SIGNIFICANT CHANGE UP (ref 0.85–1.18)
KETONES UR-MCNC: NEGATIVE MG/DL — SIGNIFICANT CHANGE UP
LEUKOCYTE ESTERASE UR-ACNC: ABNORMAL
LYMPHOCYTES # BLD AUTO: 1.18 K/UL — SIGNIFICANT CHANGE UP (ref 1–3.3)
LYMPHOCYTES # BLD AUTO: 12.6 % — LOW (ref 13–44)
MAGNESIUM SERPL-MCNC: 1.7 MG/DL — SIGNIFICANT CHANGE UP (ref 1.6–2.6)
MCHC RBC-ENTMCNC: 30.7 PG — SIGNIFICANT CHANGE UP (ref 27–34)
MCHC RBC-ENTMCNC: 32.5 GM/DL — SIGNIFICANT CHANGE UP (ref 32–36)
MCV RBC AUTO: 94.6 FL — SIGNIFICANT CHANGE UP (ref 80–100)
MONOCYTES # BLD AUTO: 0.85 K/UL — SIGNIFICANT CHANGE UP (ref 0–0.9)
MONOCYTES NFR BLD AUTO: 9.1 % — SIGNIFICANT CHANGE UP (ref 2–14)
NEUTROPHILS # BLD AUTO: 7.23 K/UL — SIGNIFICANT CHANGE UP (ref 1.8–7.4)
NEUTROPHILS NFR BLD AUTO: 77.6 % — HIGH (ref 43–77)
NITRITE UR-MCNC: NEGATIVE — SIGNIFICANT CHANGE UP
NRBC # BLD: 0 /100 WBCS — SIGNIFICANT CHANGE UP (ref 0–0)
PH UR: 6 — SIGNIFICANT CHANGE UP (ref 5–8)
PHOSPHATE SERPL-MCNC: 3.4 MG/DL — SIGNIFICANT CHANGE UP (ref 2.5–4.5)
PLATELET # BLD AUTO: 160 K/UL — SIGNIFICANT CHANGE UP (ref 150–400)
POTASSIUM SERPL-MCNC: 3.8 MMOL/L — SIGNIFICANT CHANGE UP (ref 3.5–5.3)
POTASSIUM SERPL-SCNC: 3.8 MMOL/L — SIGNIFICANT CHANGE UP (ref 3.5–5.3)
PROT SERPL-MCNC: 6 G/DL — SIGNIFICANT CHANGE UP (ref 6–8.3)
PROT UR-MCNC: SIGNIFICANT CHANGE UP MG/DL
PROTHROM AB SERPL-ACNC: 10.3 SEC — SIGNIFICANT CHANGE UP (ref 9.5–13)
RBC # BLD: 4.07 M/UL — SIGNIFICANT CHANGE UP (ref 3.8–5.2)
RBC # FLD: 13 % — SIGNIFICANT CHANGE UP (ref 10.3–14.5)
RBC CASTS # UR COMP ASSIST: 0 /HPF — SIGNIFICANT CHANGE UP (ref 0–4)
RH IG SCN BLD-IMP: POSITIVE — SIGNIFICANT CHANGE UP
SODIUM SERPL-SCNC: 138 MMOL/L — SIGNIFICANT CHANGE UP (ref 135–145)
SODIUM UR-SCNC: 37 MMOL/L — SIGNIFICANT CHANGE UP
SP GR SPEC: 1.01 — SIGNIFICANT CHANGE UP (ref 1–1.03)
SQUAMOUS # UR AUTO: 0 /HPF — SIGNIFICANT CHANGE UP (ref 0–5)
TROPONIN T, HIGH SENSITIVITY RESULT: 70 NG/L — CRITICAL HIGH (ref 0–51)
TROPONIN T, HIGH SENSITIVITY RESULT: 74 NG/L — CRITICAL HIGH (ref 0–51)
UROBILINOGEN FLD QL: 0.2 MG/DL — SIGNIFICANT CHANGE UP (ref 0.2–1)
UUN UR-MCNC: 347 MG/DL — SIGNIFICANT CHANGE UP
WBC # BLD: 9.33 K/UL — SIGNIFICANT CHANGE UP (ref 3.8–10.5)
WBC # FLD AUTO: 9.33 K/UL — SIGNIFICANT CHANGE UP (ref 3.8–10.5)
WBC UR QL: 2 /HPF — SIGNIFICANT CHANGE UP (ref 0–5)

## 2024-05-07 PROCEDURE — 99223 1ST HOSP IP/OBS HIGH 75: CPT

## 2024-05-07 PROCEDURE — 99233 SBSQ HOSP IP/OBS HIGH 50: CPT | Mod: GC

## 2024-05-07 PROCEDURE — 93010 ELECTROCARDIOGRAM REPORT: CPT

## 2024-05-07 PROCEDURE — 95720 EEG PHY/QHP EA INCR W/VEEG: CPT

## 2024-05-07 RX ORDER — LEVOTHYROXINE SODIUM 125 MCG
100 TABLET ORAL DAILY
Refills: 0 | Status: DISCONTINUED | OUTPATIENT
Start: 2024-05-07 | End: 2024-05-09

## 2024-05-07 RX ORDER — SODIUM CHLORIDE 9 MG/ML
1000 INJECTION, SOLUTION INTRAVENOUS
Refills: 0 | Status: DISCONTINUED | OUTPATIENT
Start: 2024-05-07 | End: 2024-05-09

## 2024-05-07 RX ORDER — FOLIC ACID 0.8 MG
1 TABLET ORAL DAILY
Refills: 0 | Status: DISCONTINUED | OUTPATIENT
Start: 2024-05-07 | End: 2024-05-09

## 2024-05-07 RX ORDER — MAGNESIUM SULFATE 500 MG/ML
2 VIAL (ML) INJECTION ONCE
Refills: 0 | Status: COMPLETED | OUTPATIENT
Start: 2024-05-07 | End: 2024-05-07

## 2024-05-07 RX ORDER — POTASSIUM CHLORIDE 20 MEQ
20 PACKET (EA) ORAL ONCE
Refills: 0 | Status: COMPLETED | OUTPATIENT
Start: 2024-05-07 | End: 2024-05-07

## 2024-05-07 RX ADMIN — Medication 1 TABLET(S): at 11:54

## 2024-05-07 RX ADMIN — Medication 25 GRAM(S): at 09:29

## 2024-05-07 RX ADMIN — Medication 105 MILLIGRAM(S): at 18:14

## 2024-05-07 RX ADMIN — SODIUM CHLORIDE 100 MILLILITER(S): 9 INJECTION, SOLUTION INTRAVENOUS at 11:55

## 2024-05-07 RX ADMIN — HEPARIN SODIUM 5000 UNIT(S): 5000 INJECTION INTRAVENOUS; SUBCUTANEOUS at 14:09

## 2024-05-07 RX ADMIN — Medication 105 MILLIGRAM(S): at 01:43

## 2024-05-07 RX ADMIN — Medication 20 MILLIEQUIVALENT(S): at 09:29

## 2024-05-07 RX ADMIN — HEPARIN SODIUM 5000 UNIT(S): 5000 INJECTION INTRAVENOUS; SUBCUTANEOUS at 22:08

## 2024-05-07 RX ADMIN — HEPARIN SODIUM 5000 UNIT(S): 5000 INJECTION INTRAVENOUS; SUBCUTANEOUS at 05:34

## 2024-05-07 RX ADMIN — Medication 105 MILLIGRAM(S): at 11:55

## 2024-05-07 RX ADMIN — SODIUM CHLORIDE 100 MILLILITER(S): 9 INJECTION, SOLUTION INTRAVENOUS at 19:33

## 2024-05-07 RX ADMIN — Medication 100 MICROGRAM(S): at 09:55

## 2024-05-07 RX ADMIN — Medication 1 MILLIGRAM(S): at 11:54

## 2024-05-07 NOTE — PROGRESS NOTE ADULT - PROBLEM SELECTOR PLAN 4
On admission, presented with /64. Repeat 120/60. On home HCTZ-Losartan 100mg-12.5mg, Atenolol 50mg, and Lasix 40mg PO   - c/w home HCTZ-Losartan 100mg-12.5mg and Atenolol 50mg   - Hold Lasix 40mg PO for now as patient is currently euvolemic, restart as clinically indicated On admission, presented with /64. Repeat 120/60. On home HCTZ-Losartan 100mg-12.5mg, Atenolol 50mg, and Lasix 40mg PO   plan  - c/w home HCTZ-Losartan 100mg-12.5mg and Atenolol 50mg   - Hold Lasix 40mg PO for now as patient is currently euvolemic, restart as clinically indicated Patient with recent increase in ETOH consumption per POA. Home frequently seen to have empty bottles of ETOH in her garbage during routine check-ins. Furthermore, she may be masking amount of ETOH consumption from assistant personel who routinely checks on her. No known history of DTs or seizures.   - CIWA cannot be performed due to patient's current mental status.  - Last drink: unknown  plan  - c/w CIWA protocol, ativan 2mg PRN for CIWA > 8 p  - give thiamine [id number] for 3 days, then 250mg daily  - multivitamin and folic acid daily when able to tolerate PO  - monitor EKG with QT prolonging meds  - fall precautions

## 2024-05-07 NOTE — DISCHARGE NOTE PROVIDER - NSDCMRMEDTOKEN_GEN_ALL_CORE_FT
atenolol 50 mg oral tablet: 1 tab(s) orally once a day  losartan-hydroCHLOROthiazide 100 mg-12.5 mg oral tablet: 1 tab(s) orally once a day  simvastatin 40 mg oral tablet: 1 tab(s) orally once a day  Synthroid 100 mcg (0.1 mg) oral tablet: 1 tab(s) orally once a day   atenolol 50 mg oral tablet: 1 tab(s) orally once a day  losartan-hydroCHLOROthiazide 100 mg-12.5 mg oral tablet: 1 tab(s) orally once a day  simvastatin 40 mg oral tablet: 1 tab(s) orally once a day  Synthroid 100 mcg (0.1 mg) oral tablet: 1 tab(s) orally once a day  thiamine 250 mg oral tablet: 1 tab(s) orally once a day   atenolol 50 mg oral tablet: 1 tab(s) orally once a day  folic acid 1 mg oral tablet: 1 tab(s) orally once a day  furosemide 40 mg oral tablet: 1 tab(s) orally once a day  losartan-hydroCHLOROthiazide 100 mg-12.5 mg oral tablet: 1 tab(s) orally once a day  Multiple Vitamins oral tablet: 1 tab(s) orally once a day  senna leaf extract oral tablet: 2 tab(s) orally once a day (at bedtime)  simvastatin 40 mg oral tablet: 1 tab(s) orally once a day  Synthroid 100 mcg (0.1 mg) oral tablet: 1 tab(s) orally once a day  thiamine 250 mg oral tablet: 1 tab(s) orally once a day

## 2024-05-07 NOTE — PROGRESS NOTE ADULT - PROBLEM SELECTOR PLAN 6
Known history of HLD. On home Simvastatin 40mg  - C/w therapeutic interchange Lipitor On admission, presented with /64. Repeat 120/60. On home HCTZ-Losartan 100mg-12.5mg, Atenolol 50mg, and Lasix 40mg PO   plan  - c/w home HCTZ-Losartan 100mg-12.5mg and Atenolol 50mg   - Hold Lasix 40mg PO for now as patient is currently euvolemic, restart as clinically indicated

## 2024-05-07 NOTE — OCCUPATIONAL THERAPY INITIAL EVALUATION ADULT - MODIFIED CLINICAL TEST OF SENSORY INTEGRATION IN BALANCE TEST
Pt sat EOB ~8 minutes with poor balance, required min-modA to regain balance. Pt performed STS with minAx2 +B/L handheld assist. Pt required modAx2 in standing, pt retropulsive in standing, pt required modAx2 for 5 side steps to HOB, able to mildly clear the floor during steps.

## 2024-05-07 NOTE — PROGRESS NOTE ADULT - PROBLEM SELECTOR PLAN 8
Plan:  F: s/p 2L NS in the ED   E: replete K<4, Mg<2  N: NPO  VTE Prophylaxis: Heparin SubQ  C: Full Code  D: KAYLA Known history of HLD. On home Simvastatin 40mg  - C/w therapeutic interchange Lipitor

## 2024-05-07 NOTE — OCCUPATIONAL THERAPY INITIAL EVALUATION ADULT - GENERAL OBSERVATIONS, REHAB EVAL
Pt received semi-supine in bed +IV +primafit +vEEG, in NAD and agreeable to OT. Cleared by ESTELLA Roe to see pt.

## 2024-05-07 NOTE — OCCUPATIONAL THERAPY INITIAL EVALUATION ADULT - ADDITIONAL COMMENTS
Pt lives in an apartment alone with no BO, elevator access. Pt uses a cane to ambulate in the house, and a RW outside of the house. Pt has a walk in shower with grab bars and a shower chair. Pt has a HHA to assist with household tasks 1-2 times a week and a cleaning person 1x a week.

## 2024-05-07 NOTE — PROGRESS NOTE ADULT - PROBLEM SELECTOR PLAN 1
Presents after being found down over the weekend, found this morning. On admission, did not meet SIRS criteria. U/A + bacteria, leuk esterase, 2 WBC, protein, ketones. . S/p CTX and 2L NS. Lactate 2.5. Likely 2/2 ETOH abuse vs. progressive dementia vs. NPH vs. metabolic derangements. 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 CT cervical - degenerative changes  - Obtain Neuro consult, appreciate recs  - Obtain Utox, TSH, B12, Ammonia, Tylenol, ASA levels  - Monitor off Abx as U/A without pyruria  - F/u BCx and UCx  - Obtain vEEG to rule out seizures  - Obtain MRI w/w/o contrast Presents after being found down over the weekend, found this morning. On admission, did not meet SIRS criteria. U/A + bacteria, leuk esterase, 2 WBC, protein, ketones. . S/p CTX and 2L NS. Lactate 2.5. Likely 2/2 ETOH abuse vs. progressive dementia vs. NPH vs. metabolic derangements. 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 CT cervical - degenerative changes  plan  - Per neuro:   Drug screen, TSH, B12, folate, syphilis screen   - If infectious/toxic/metabolic causes of AMS is r/o can then consider LP to further eval opening pressure and significance of dilated ventricles   - Monitor off Abx as U/A without pyruria  - F/u BCx and UCx  -  vEEG   - Obtain MRI w/w/o contrast Etiology most likely in setting of wernickes vs less likely toxic metabolic   Presents after being found down over the weekend, found this morning. On admission, did not meet SIRS criteria. U/A + bacteria, leuk esterase, 2 WBC, protein, ketones. . S/p CTX and 2L NS. Lactate 2.5. Likely 2/2 ETOH abuse vs. progressive dementia vs. NPH vs. metabolic derangements. 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 CT cervical - degenerative changes  - EEG showing triphasic waves associated with toxic metabolic derangement which improved/ resolved by the end of the recording, as well as asymmetric slowing over the right frontotemporal area indicative of non-specific focal cerebral dysfunction.  plan  - Pending recs from neuro  - Monitor off Abx, repeat UA clear  - F/u BCx and UCx  - will hold MRI w/w/o contrast

## 2024-05-07 NOTE — CONSULT NOTE ADULT - ASSESSMENT
89F w/ PMHx of Dementia c/b frequent falls, hypothyroidism, HTN, HLD was BIBEMS after being found down, admitted for toxic metabolic encephalopathy. Patient does have a limited history and physical secondary to AMS. Non focal neurologic exam however severely limited at this time 2/2 somnolence. Unclear time line in terms of when event happened. Mild WBC noted, mild hyponatremia however not suspected to be low enough to cause presentation. Glucose normal. Lactate slightly elevated - consider seizure vs. dehydration. Only mildly elevated CK so do not suspect down for to long. UA weekly positive and unable to ask about urinary symptoms. CTH c/w Parenchymal volume loss and chronic microvessel ischemic changes are identified dilated lateral and third ventricles are seen with a normal-appearing fourth ventricle. These findings can be seen i/s/o parenchymal volume loss and NPH. No mass seen. Differential is broad consider Toxic metabolic encephalopathy 2/2 infection vs. ETOH abuse/ Wernicke's encephalopathy seizure possible, NPH can cause gait instability leading to fall and urinary incontinence leading to UTI but would not explain the continued encephalopathic state. Stoke is less likely.     Recommendations:   - Drug screen  - TSH, B12, folate, syphilis screen   -  vEEG  - Consider treating for UTI (risk vs. benefit of treating even asymptomatic bacteruria in an encephalopathic patient is low)   - If infectious/toxic/metabolic causes of AMS is r/o can then consider LP to further eval opening pressure and significance of dilated ventricles       Neurology will continue to follow.       
{\rtf1\gpelyi67503\ansi\dlaoejv4641\ftnbj\uc1\deff0  {\fonttbl{\f0 \fnil Segoe UI;}{\f1 \fnil \fcharset0 Segoe UI;}{\f2 \fnil Times New Marcial;}}  {\colortbl ;\bcm708\ccoab816\liqt336 ;\red0\green0\blue0 ;\red0\green0\cggw034 ;\red0\green0\blue0 ;}  {\stylesheet{\f0\fs20 Normal;}{\cs1 Default Paragraph Font;}{\cs2\f0\fs16 Line Number;}{\cs3\f2\fs24\ul\cf3 Hyperlink;}}  {\*\revtbl{Unknown;}}  \hotetc74528\tgebgb79511\exljc9473\qrnyk0190\ccqhu9860\hgjjb2835\wbpaqwh837\cbmnsed747\nogrowautofit\nipwei086\formshade\nofeaturethrottle1\dntblnsbdb\fet4\aendnotes\aftnnrlc\pgbrdrhead\pgbrdrfoot  \sectd\ippvln24658\llypza65159\guttersxn0\vnlqgdem8563\sodrvrtd4564\pxzibity7943\mylvmwfi5466\ybospcq514\wrjpdor074\sbkpage\pgncont\pgndec  \plain\plain\f0\fs24\ql\plain\f0\fs24\plain\f0\fs20\upow0800\hich\f0\dbch\f0\loch\f0\fs20\par  I M\par  \par  89 y o F w/ PMHx of Dementia c/b frequent falls, hypothyroidism, HTN, HLD was BIBEMS after being found down, admitted for toxic metabolic encephalopathy.\par  \par  \plain\f1\fs20\lylj1467\hich\f1\dbch\f1\loch\f1\cf2\fs20\ul{\field{\*\fldinst HYPERLINK [id number] }{\fldrslt Problem/Plan - 1:}}\plain\f0\fs20\irxj8626\hich\f0\dbch\f0\loch\f0\fs20\ql\par  \'b7  {\*\bkmkstart [id number]}{\*\bkmkend ut34525393766}Problem: {\*\bkmkstart rt16753426678}{\*\bkmkend ex48935520886}Toxic metabolic encephalopathy. \par  \'b7  {\*\bkmkstart so50873452881}{\*\bkmkend xh08467548681}Plan: {\*\bkmkstart nq28108879993}{\*\bkmkend da92515231239}Presents after being found down over the weekend, found this morning. On admission, did not meet SIRS criteria. U/A + bacteria, leuk   esterase, 2 WBC, protein, ketones. . S/p CTX and 2L NS. Lactate 2.5. Likely 2/2 ETOH abuse vs. progressive dementia vs. NPH vs. metabolic derangements. 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 CT cervical - degenerative changes\par  - Obtain Neuro consult, appreciate recs\par  - Obtain Utox, TSH, B12, Ammonia, Tylenol, ASA levels\par  - Monitor off Abx as U/A without pyruria\par  - F/u BCx and UCx\par  - Obtain vEEG to rule out seizures\par  - Obtain MRI w/w/o contrast.\plain\f1\fs20\urlf4262\hich\f1\dbch\f1\loch\f1\cf2\fs20\strike\plain\f0\fs20\nmks2575\hich\f0\dbch\f0\loch\f0\fs20\par  \par  \plain\f1\fs20\muvs9579\hich\f1\dbch\f1\loch\f1\cf2\fs20\ul{\field{\*\fldinst HYPERLINK [id number] }{\fldrslt Problem/Plan - 2:}}\plain\f0\fs20\fezg4897\hich\f0\dbch\f0\loch\f0\fs20\ql\par  \'b7  {\*\bkmkstart tl63752935782}{\*\bkmkend yr20467591831}Problem: {\*\bkmkstart mv19182901198}{\*\bkmkend lr79905621997}ETOH abuse. \par  \'b7  {\*\bkmkstart gn63974726156}{\*\bkmkend yk58907084468}Plan: {\*\bkmkstart xp86068453289}{\*\bkmkend kl59134294020}Patient with recent increase in ETOH consumption per POA. Home frequently seen to have empty bottles of ETOH in her garbage during   routine check-ins. Furthermore, she may be masking amount of ETOH consumption from assistant personel who routinely checks on her. No known history of DTs or seizures. \par  - CIWA cannot be performed due to patient's current mental status.\par  - Last drink: unknown\par  - c/w CIWA protocol, ativan 2mg PRN for CIWA > 8 p\par  - give thiamine 978c8fkx for 3 days, then 250mg daily\par  - multivitamin and folic acid daily when able to tolerate PO\par  - monitor EKG with QT prolonging meds\par  - fall precautions.\par  \par  \plain\f1\fs20\dcro0800\hich\f1\dbch\f1\loch\f1\cf2\fs20\ul{\field{\*\fldinst HYPERLINK [id number] }{\fldrslt Problem/Plan - 3:}}\plain\f0\fs20\ccmr0518\hich\f0\dbch\f0\loch\f0\fs20\ql\par  \'b7  {\*\bkmkstart is53214337905}{\*\bkmkend fb01603370045}Problem: {\*\bkmkstart ks66565342485}{\*\bkmkend ak65064983481}Fall. \par  \'b7  {\*\bkmkstart dp62655354475}{\*\bkmkend ds50472445380}Plan: {\*\bkmkstart dn24031479843}{\*\bkmkend zm40842655846}Patient with history of multiple falls in the past few months. Patient was found down after a suspected fall this weekend. Last known   well on Saturday.  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 CT cervical - degenerative changes. Likely 2/2 progressive dementia vs. ETOH use. vs. NPH. \par  - PT consult\par  - Obtain orthostatics\par  - Pain management: Tylenol 650mg q6 IV PRN, lidocaine patch\par  - Weight bearing/Fall precautions.\par  \par  \plain\f1\fs20\uvlo7918\hich\f1\dbch\f1\loch\f1\cf2\fs20\ul{\field{\*\fldinst HYPERLINK [id number] }{\fldrslt Problem/Plan - 4:}}\plain\f0\fs20\cled0799\hich\f0\dbch\f0\loch\f0\fs20\ql\par  \'b7  {\*\bkmkstart jb57303192692}{\*\bkmkend hp69222297680}Problem: {\*\bkmkstart vt32357480922}{\*\bkmkend dc08173580239}Hypertension. \par  \'b7  {\*\bkmkstart jy05775801363}{\*\bkmkend sj95290292594}Plan: {\*\bkmkstart tp79376992112}{\*\bkmkend jb78456131760}On admission, presented with /64. Repeat 120/60. On home HCTZ-Losartan 100mg-12.5mg, Atenolol 50mg, and Lasix 40mg PO \par  - c/w home HCTZ-Losartan 100mg-12.5mg and Atenolol 50mg \par  - Hold Lasix 40mg PO for now as patient is currently euvolemic, restart as clinically indicated.\par  \par  \plain\f1\fs20\uepa8560\hich\f1\dbch\f1\loch\f1\cf2\fs20\ul{\field{\*\fldinst HYPERLINK [id number] }{\fldrslt Problem/Plan - 5:}}\plain\f0\fs20\fcqh5726\hich\f0\dbch\f0\loch\f0\fs20\ql\par  \'b7  {\*\bkmkstart qp12240671486}{\*\bkmkend se99093151591}Problem: {\*\bkmkstart nr48075033722}{\*\bkmkend sx31839703954}Dementia. \par  \'b7  {\*\bkmkstart he67977440070}{\*\bkmkend wn75193390844}Plan: {\*\bkmkstart to85856930232}{\*\bkmkend wc45957467260}Known history of dementia. Per POA (Armaan Machuca Jr), patient is conversive and can ambulate, however has slowed movements and takes   time to execute plans (going to the bathroom, kitchen, etc). Recently, she has become more forgetful\par  - Aspiration and fall precautions\par  - Speech and Swallow evaluation\par  - SW consult for HHA.\par  \par  \plain\f1\fs20\tegj9064\hich\f1\dbch\f1\loch\f1\cf2\fs20\ul{\field{\*\fldinst HYPERLINK [id number] }{\fldrslt Problem/Plan - 6:}}\plain\f0\fs20\apog9008\hich\f0\dbch\f0\loch\f0\fs20\ql\par  \'b7  {\*\bkmkstart mm39716937468}{\*\bkmkend pd29036838473}Problem: {\*\bkmkstart te51656534031}{\*\bkmkend vw44563640250}HLD (hyperlipidemia). \par  \'b7  {\*\bkmkstart an97565906727}{\*\bkmkend yy88215946536}Plan: {\*\bkmkstart ly40462354245}{\*\bkmkend fj55345692583}Known history of HLD. On home Simvastatin 40mg\par  - C/w therapeutic interchange Lipitor.\par  \par  \plain\f1\fs20\xmsw0447\hich\f1\dbch\f1\loch\f1\cf2\fs20\ul{\field{\*\fldinst HYPERLINK [id number] }{\fldrslt Problem/Plan - 7:}}\plain\f0\fs20\cmku9627\hich\f0\dbch\f0\loch\f0\fs20\ql\par  \'b7  {\*\bkmkstart is17064934629}{\*\bkmkend kp69360269883}Problem: {\*\bkmkstart vr92679434768}{\*\bkmkend nt82224414846}Hypothyroidism. \par  \'b7  {\*\bkmkstart kr04124118167}{\*\bkmkend mx77217347356}Plan: {\*\bkmkstart ur23657906382}{\*\bkmkend vx21495622807}Known history of hypothyroidism. On home Synthroid 100mcg\par  - C/w Synthroid 100mcg.\par  \par  \plain\f1\fs20\nugf0302\hich\f1\dbch\f1\loch\f1\cf2\fs20\ul{\field{\*\fldinst HYPERLINK [id number] }{\fldrslt Problem/Plan - 8:}}\plain\f0\fs20\bznm8029\hich\f0\dbch\f0\loch\f0\fs20\ql\par  \'b7  {\*\bkmkstart ds64827147699}{\*\bkmkend vb41561353049}Problem: {\*\bkmkstart lm49524989911}{\*\bkmkend wv79099236779}Need for prophylactic measure. \par  \'b7  {\*\bkmkstart zr58459529700}{\*\bkmkend hx50582146811}Plan: {\*\bkmkstart ez07935401480}{\*\bkmkend zo91606389633}Plan:\par  F: s/p 2L NS in the ED \par  E: replete K<4, Mg<2\par  N: NPO\par  VTE Prophylaxis: Heparin SubQ\par  C: Full Code\par  D: RMF.\par  \par  \par  }

## 2024-05-07 NOTE — OCCUPATIONAL THERAPY INITIAL EVALUATION ADULT - DIAGNOSIS, OT EVAL
Pt admitted for fall. Pt presents with impaired cognition decreased strength, ROM, balance, endurance, impacting ability to perform ADL and mobility.

## 2024-05-07 NOTE — PROGRESS NOTE ADULT - TREATMENT GUIDELINES
Spoke to lina Finley, who manages case for Armaan Machuca Jr. (POA). Malia stated that patient had a living will but never filled out a HCP or MOLST, also stated patient "thought she was going to live forever."

## 2024-05-07 NOTE — DIETITIAN INITIAL EVALUATION ADULT - PROBLEM SELECTOR PLAN 3
Patient with history of multiple falls in the past few months. Patient was found down after a suspected fall this weekend. Last known well on Saturday.  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 CT cervical - degenerative changes. Likely 2/2 progressive dementia vs. ETOH use. vs. NPH.   - PT consult  - Obtain orthostatics  - Pain management: Tylenol 650mg q6 IV PRN, lidocaine patch  - Weight bearing/Fall precautions

## 2024-05-07 NOTE — PROGRESS NOTE ADULT - PROBLEM SELECTOR PLAN 7
Known history of hypothyroidism. On home Synthroid 100mcg  - C/w Synthroid 100mcg Known history of dementia. Per POA (Armaan Machuca Jr), patient is conversive and can ambulate, however has slowed movements and takes time to execute plans (going to the bathroom, kitchen, etc). Recently, she has become more forgetful  - Aspiration and fall precautions  - Speech and Swallow evaluation  -  consult for HHA Known history of dementia. Per POA (Armaan Machuca Jr), patient is conversive and can ambulate, however has slowed movements and takes time to execute plans (going to the bathroom, kitchen, etc). Recently, she has become more forgetful  - SW consult for ZENA

## 2024-05-07 NOTE — PHYSICAL THERAPY INITIAL EVALUATION ADULT - GAIT DEVIATIONS NOTED, PT EVAL
Pt w/ dec weight shifting abilities/slightly unsteady gait however no LOB/falls or knee buckling observed. Dec step length and rimma noted. Pt w/ tendency to shuffle steps instead of performing hip and knee flex. No SOB observed./decreased rimma/decreased velocity of limb motion/decreased step length/decreased weight-shifting ability

## 2024-05-07 NOTE — DISCHARGE NOTE PROVIDER - HOSPITAL COURSE
#Discharge: do not delete    89F w/ PMHx of Dementia c/b frequent falls, hypothyroidism, HTN, HLD was BIBEMS after being found down, admitted for toxic metabolic encephalopathy.    1. Toxic metabolic encephalopathy: Presents after being found down over the weekend, found this morning. On admission, did not meet SIRS criteria. U/A + bacteria, leuk esterase, 2 WBC, protein, ketones. . S/p CTX and 2L NS. Lactate 2.5. Likely 2/2 ETOH abuse vs. progressive dementia vs. NPH vs. metabolic derangements. 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 CT cervical - degenerative changes  plan  - Per neuro: Drug screen, TSH, B12, folate, syphilis screen   - If infectious/toxic/metabolic causes of AMS is r/o can then consider LP to further eval opening pressure and significance of dilated ventricles   - Monitor off Abx as U/A without pyruria  - F/u BCx and UCx  -  vEEG   - Obtain MRI w/w/o contrast.    2. ETOH abuse: Patient with recent increase in ETOH consumption per POA. Home frequently seen to have empty bottles of ETOH in her garbage during routine check-ins. Furthermore, she may be masking amount of ETOH consumption from assistant personel who routinely checks on her. No known history of DTs or seizures.   - CIWA cannot be performed due to patient's current mental status.  - Last drink: unknown  plan  - c/w CIWA protocol, ativan 2mg PRN for CIWA > 8 p  - give thiamine 052m2tow for 3 days, then 250mg daily  - multivitamin and folic acid daily when able to tolerate PO  - monitor EKG with QT prolonging meds  - fall precautions.    3. Fall: Patient with history of multiple falls in the past few months. Patient was found down after a suspected fall this weekend. Last known well on Saturday.  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 CT cervical - degenerative changes. Likely 2/2 progressive dementia vs. ETOH use. vs. NPH.   plan  - PT consult  - Obtain orthostatics  - Pain management: Tylenol 650mg q6 IV PRN, lidocaine patch  - Weight bearing/Fall precautions.    4. Hypertension: On admission, presented with /64. Repeat 120/60. On home HCTZ-Losartan 100mg-12.5mg, Atenolol 50mg, and Lasix 40mg PO   plan  - c/w home HCTZ-Losartan 100mg-12.5mg and Atenolol 50mg   - Hold Lasix 40mg PO for now as patient is currently euvolemic, restart as clinically indicated.    5. Dementia: Known history of dementia. Per POA (Armaan Machuca Jr), patient is conversive and can ambulate, however has slowed movements and takes time to execute plans (going to the bathroom, kitchen, etc). Recently, she has become more forgetful  - Aspiration and fall precautions  - Speech and Swallow evaluation  -  consult for HHA.    6. HLD: Known history of HLD. On home Simvastatin 40mg  - C/w home med    7. Hypothyroidism: Known history of hypothyroidism. On home Synthroid 100mcg  - C/w Synthroid 100mcg.      Patient was discharged to: (home/[facility]/acute rehab/hospice, etc, and with what services – home health PT/RN? Home O2?)    New medications:   Changes to old medications:  Medications that were stopped:    Items to follow up as outpatient:    Physical exam at the time of discharge:  General: WDWN  HEENT: NC/AT; PERRL, anicteric sclera; MMM  Neck: supple  Cardiovascular: +S1/S2; RRR  Respiratory: CTA B/L; no W/R/R  Gastrointestinal: soft, NT/ND; +BSx4  Extremities: WWP; no edema, clubbing or cyanosis  Vascular: 2+ radial, DP/PT pulses B/L  Neurological: AAOx2-3; no focal deficits       #Discharge: do not delete    89F w/ PMHx of Dementia c/b frequent falls, hypothyroidism, HTN, HLD was BIBEMS after being found down, admitted for toxic metabolic encephalopathy.    #Metabolic encephalopathy:   Presents after being found down over the weekend, found this morning. On admission, did not meet SIRS criteria. U/A + bacteria, leuk esterase, 2 WBC, protein, ketones. . S/p CTX and 2L NS. Lactate 2.5. Likely 2/2 ETOH abuse vs. progressive dementia vs. NPH vs. metabolic derangements. 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 CT cervical - degenerative changes  plan  - vEEG consistent with metabolic encephalopathy, likely 2/2 thiamine deficiency iso alcohol use, patient improved with thiamine repletion   - MRI to be done outpatient to work up NPH, however less likely NPH given presentation and improvement with thiamine    #ETOH abuse:   Patient with recent increase in ETOH consumption per POA. Home frequently seen to have empty bottles of ETOH in her garbage during routine check-ins. Furthermore, she may be masking amount of ETOH consumption from assistant personel who routinely checks on her. No known history of DTs or seizures.   - patient received IV thiamine 500mg q8hrs for 3 days, followed by 250mg daily  - patient received multivitamin and folic acid daily    #Fall:   Patient with history of multiple falls in the past few months. Patient was found down after a suspected fall this weekend. Last known well on Saturday.  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 CT cervical - degenerative changes. Likely 2/2 progressive dementia vs. ETOH use. vs. NPH.   plan  - PT consult recommended [facility]     #Hypertension:   On admission, presented with /64. Repeat 120/60. On home HCTZ-Losartan 100mg-12.5mg, Atenolol 50mg, and Lasix 40mg PO   plan  - c/w home HCTZ-Losartan 100mg-12.5mg and Atenolol 50mg   - resumed lasix 40mg qd     #Dementia:   Known history of dementia. Per POA (Armaan Machuca Jr), patient is conversive and can ambulate, however has slowed movements and takes time to execute plans (going to the bathroom, kitchen, etc). Recently, she has become more forgetful  - Aspiration and fall precautions  - patient tolerating PO and regular diet     #HLD: Known history of HLD.   - On home Simvastatin 40mg      #Hypothyroidism: Known history of hypothyroidism.   - On home Synthroid 100mcg    Patient was discharged to [facility].     New medications: Thiamine 250mg once a day, Folic Acid, Multivitamin  Changes to old medications: none   Medications that were stopped: none     Items to follow up as outpatient: Please follow up with your PCP    Physical exam at the time of discharge:  General: WDWN  HEENT: NC/AT   Cardiovascular: +S1/S2; RRR  Respiratory: CTAB  Gastrointestinal: soft, NT/ND   Extremities: WWP; no edema, clubbing or cyanosis  Vascular: 2+ radial pulses b/l   Neurological: AAOx3; but with confabulations       #Discharge: do not delete    89F w/ PMHx of Dementia c/b frequent falls, hypothyroidism, HTN, HLD was BIBEMS after being found down, admitted for toxic metabolic encephalopathy.    #Metabolic encephalopathy:   Presents after being found down over the weekend, found this morning. On admission, did not meet SIRS criteria. U/A + bacteria, leuk esterase, 2 WBC, protein, ketones. . S/p CTX and 2L NS. Lactate 2.5. Likely 2/2 ETOH abuse vs. progressive dementia vs. NPH vs. metabolic derangements. 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 CT cervical - degenerative changes  plan  - vEEG consistent with metabolic encephalopathy, likely 2/2 thiamine deficiency iso alcohol use, patient improved with thiamine repletion   - MRI to be done outpatient to work up NPH, however less likely NPH given presentation and improvement with thiamine    #ETOH abuse:   Patient with recent increase in ETOH consumption per POA. Home frequently seen to have empty bottles of ETOH in her garbage during routine check-ins. Furthermore, she may be masking amount of ETOH consumption from assistant personel who routinely checks on her. No known history of DTs or seizures.   - patient received IV thiamine 500mg q8hrs for 3 days, followed by 250mg daily  - patient received multivitamin and folic acid daily    #Fall:   Patient with history of multiple falls in the past few months. Patient was found down after a suspected fall this weekend. Last known well on Saturday.  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 CT cervical - degenerative changes. Likely 2/2 progressive dementia vs. ETOH use. vs. NPH.   plan  - PT consult recommended [facility]     #Hypertension:   On admission, presented with /64. Repeat 120/60. On home HCTZ-Losartan 100mg-12.5mg, Atenolol 50mg, and Lasix 40mg PO   plan  - c/w home HCTZ-Losartan 100mg-12.5mg and Atenolol 50mg   - resumed lasix 40mg qd     #Dementia:   Known history of dementia. Per POA (Armaan Machuca Jr), patient is conversive and can ambulate, however has slowed movements and takes time to execute plans (going to the bathroom, kitchen, etc). Recently, she has become more forgetful  - Aspiration and fall precautions  - patient tolerating PO and regular diet     #HLD: Known history of HLD.   - On home Simvastatin 40mg    #Hypothyroidism: Known history of hypothyroidism.   - On home Synthroid 100mcg    Patient was discharged to [facility].     New medications: Thiamine 250mg once a day, Folic Acid, Multivitamin  Changes to old medications: none   Medications that were stopped: none     Items to follow up as outpatient: Please follow up with your PCP    Physical exam at the time of discharge:  General: WDWN  HEENT: NC/AT   Cardiovascular: +S1/S2; RRR  Respiratory: CTAB  Gastrointestinal: soft, NT/ND   Extremities: WWP; no edema, clubbing or cyanosis  Vascular: 2+ radial pulses b/l   Neurological: AAOx3; but with confabulations

## 2024-05-07 NOTE — CHART NOTE - NSCHARTNOTEFT_GEN_A_CORE
8 pm labs with rise in cardiac enzymes. On eval, patient AAOx self (not hospital, not year, knows Elliott is president). When reoriented to hospital, patient explains she thinks she might be in the hospital because she lost her balance and fell. Reports drinking one glass of wine with dinner, last drink Sunday dinner.     EKG with <mm ST depressions in V4/V5. Denying CP/SOB/HA/any new symptoms. +waxing/waning mental status, required multiple people to assist in obtaining EKG.     Repeat cardiac enzymes at midnight with trop 70-->74, -->876. Patient still denying CP.    In absence of cardiac hx, active chest pain, troponin trend, history of likely mechanical fall found on ground, suspect demand ischemia. Low concern for ACS. Will continue to trend cardiac enzymes/EKG, consider cards consult if CP, rising CE, or ischemic changes on EKG. 8 pm labs with rise in cardiac enzymes. On eval, patient AAOx self (not hospital, not year, knows Elliott is president). When reoriented to hospital, patient explains she thinks she might be in the hospital because she lost her balance and fell. Reports drinking one glass of wine with dinner, last drink Sunday dinner.     EKG with <mm ST depressions in V4/V5. Denying CP/SOB/HA/any new symptoms. +waxing/waning mental status, required multiple people to assist in obtaining EKG.     Repeat cardiac enzymes at midnight with trop 70-->74, -->876. Patient still denying CP.    In absence of cardiac hx, active chest pain, troponin trend, history of likely mechanical fall found on ground, suspect rhabdo or demand ischemia. Low concern for ACS. Will continue to trend cardiac enzymes/EKG, consider cards consult if CP, rising CE, or ischemic changes on EKG. 8 pm labs with rise in cardiac enzymes. On eval, patient AAOx self (not hospital, not year, knows Elliott is president). When reoriented to hospital, patient explains she thinks she might be in the hospital because she lost her balance and fell. Reports drinking one glass of wine with dinner, last drink Sunday dinner.     EKG with <mm ST depressions in V4/V5. Denying CP/SOB/HA/any new symptoms. +waxing/waning mental status, agitated during EKG and required multiple people to assist in obtaining EKG. Was sinus tach 110s that improved after patient calmed down.    Repeat cardiac enzymes at midnight with trop 70-->74, -->876. Patient still denying CP.    In absence of cardiac hx, active chest pain, troponin trend, history of likely mechanical fall found on ground, suspect rhabdo or demand ischemia. Low concern for ACS. Will continue to trend cardiac enzymes/EKG, consider cards consult if CP, rising CE, or ischemic changes on EKG.

## 2024-05-07 NOTE — DIETITIAN INITIAL EVALUATION ADULT - PERTINENT LABORATORY DATA
05-07    138  |  107  |  23  ----------------------------<  79  3.8   |  19<L>  |  1.37<H>    Ca    8.8      07 May 2024 05:30  Phos  3.4     05-07  Mg     1.7     05-07    TPro  6.0  /  Alb  3.5  /  TBili  0.7  /  DBili  x   /  AST  38  /  ALT  15  /  AlkPhos  80  05-07  POCT Blood Glucose.: 117 mg/dL (05-07-24 @ 12:19)

## 2024-05-07 NOTE — DIETITIAN INITIAL EVALUATION ADULT - PROBLEM SELECTOR PLAN 2
Patient with recent increase in ETOH consumption per POA. Home frequently seen to have empty bottles of ETOH in her garbage during routine check-ins. Furthermore, she may be masking amount of ETOH consumption from assistant personel who routinely checks on her. No known history of DTs or seizures.   - CIWA cannot be performed due to patient's current mental status.  - Last drink: unknown  - c/w CIWA protocol, ativan 2mg PRN for CIWA > 8 p  - give thiamine 197x8eko for 3 days, then 250mg daily  - multivitamin and folic acid daily when able to tolerate PO  - monitor EKG with QT prolonging meds  - fall precautions

## 2024-05-07 NOTE — DIETITIAN INITIAL EVALUATION ADULT - PROBLEM SELECTOR PLAN 4
On admission, presented with /64. Repeat 120/60. On home HCTZ-Losartan 100mg-12.5mg, Atenolol 50mg, and Lasix 40mg PO   - c/w home HCTZ-Losartan 100mg-12.5mg and Atenolol 50mg   - Hold Lasix 40mg PO for now as patient is currently euvolemic, restart as clinically indicated

## 2024-05-07 NOTE — PHYSICAL THERAPY INITIAL EVALUATION ADULT - ADDITIONAL COMMENTS
Pt slightly confused during conversation -  PT unclear of level of accuracy. Pt currently resides in apartment - has HHA 1-2x/week to assist as needed. Pt stated performing dressing tasks independently. Experienced only 1 fall within past 6 months. Pt stated using SC in home and RW for outdoor ambulation.

## 2024-05-07 NOTE — OCCUPATIONAL THERAPY INITIAL EVALUATION ADULT - PERTINENT HX OF CURRENT PROBLEM, REHAB EVAL
89F w/ PMHx of Dementia c/b frequent falls, hypothyroidism, HTN, HLD was BIBEMS after being found down, admitted for toxic metabolic encephalopathy. Collateral obtained from her POA (Armaan Machuca Jr , Esq [phone]) and PCP Dr. Perry ([phone]). Patient had a telephonic visit this past weekend with her PCP and at that time, she was at her baseline (AAOx2-3 and functional). She subsequently had a fall at sometime on 05/05 and was found down by her assistant this morning, which prompted the ED visit. Per POA, patient has recently become more confused and often repeats the same stories for the past few weeks. Of note, the assistant has noticed multiple empty wine bottles in the garbage. Per POA, she ordered 12 bottles of wine x2 in the past 2 weeks. No known history of withdrawal, DT, or seizures. ROS limited by clinical status.

## 2024-05-07 NOTE — PROGRESS NOTE ADULT - PROBLEM SELECTOR PLAN 3
Patient with history of multiple falls in the past few months. Patient was found down after a suspected fall this weekend. Last known well on Saturday.  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 CT cervical - degenerative changes. Likely 2/2 progressive dementia vs. ETOH use. vs. NPH.   - PT consult  - Obtain orthostatics  - Pain management: Tylenol 650mg q6 IV PRN, lidocaine patch  - Weight bearing/Fall precautions Patient with history of multiple falls in the past few months. Patient was found down after a suspected fall this weekend. Last known well on Saturday.  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 CT cervical - degenerative changes. Likely 2/2 progressive dementia vs. ETOH use. vs. NPH.   plan  - PT consult  - Obtain orthostatics  - Pain management: Tylenol 650mg q6 IV PRN, lidocaine patch  - Weight bearing/Fall precautions Patient presents with elevated troponin most likely i/s/o of type 2 demand ischemia  - Trop plateau then downtrended  - Patient denies chest pain on exam, EKG: shows possible type 1 block, no signs of ischemia  plan  resolved

## 2024-05-07 NOTE — PROGRESS NOTE ADULT - PROBLEM SELECTOR PROBLEM 7
Chief complaint:   Chief Complaint   Patient presents with   • Imm/Inj     Spinal stenosis of cervical region    • Back Pain   • Neck Pain       Vitals:  Visit Vitals  /88   Pulse 62   Resp 16   Ht 4' 11\" (1.499 m)   Wt 87.1 kg (192 lb)   BMI 38.78 kg/m²       HISTORY OF PRESENT ILLNESS     HPI  Ms. Bliss is a 76 year old female  with a history of back and neck pain who presents today for a follow up visit. She states that the pain is the same. She saw Dr. Valencia as advised for possible surgical options for myelomalacia detected on MRI  who suggested PT and a follow up for possible surgical decompression. She also has ongoing low back pain. She is no longer on gabapentin which I had prescribed to her at the last visit due to side effects. The tizanidine had no effect which is why she stopped taking it as well. She was told by another physician that it was \"just a muscle relaxer\".     Other significant problems:  Patient Active Problem List    Diagnosis Date Noted   • Chronic neck pain      Priority: High   • Chronic low back pain      Priority: High   • Mild persistent asthma without complication 09/17/2015     Priority: High   • Glaucoma suspect 07/23/2014     Priority: High   • Pseudophakia of both eyes 07/23/2014     Priority: High   • Generalized anxiety disorder      Priority: Medium   • Gastroesophageal reflux disease 05/23/2014     Priority: Medium   • Pulmonary hypertension due to left ventricular diastolic dysfunction (CMS/[facility])      Priority: Low   • Recurrent major depressive disorder (CMS/[facility])      Priority: Low   • Chronic insomnia      Priority: Low   • Migraine headaches      Priority: Low   • Obstructive sleep apnea on CPAP 06/13/2016     Priority: Low     Mild per sleep study 6/13/2016.     • Stage 3b chronic kidney disease (CMS/HCC) 04/01/2016     Priority: Low   • Bilateral dry eyes 01/28/2016     Priority: Low   • Chronic heart failure with preserved ejection fraction (HFpEF) (CMS/[facility])       Priority: Low   • Nonrheumatic mitral valve regurgitation 06/04/2015     Priority: Low   • Nonrheumatic tricuspid valve regurgitation 06/04/2015     Priority: Low   • Decreased diffusion capacity of lung 05/21/2015     Priority: Low   • Chronic nonallergic rhinitis 05/02/2014     Priority: Low   • Chronic cough 05/02/2014     Priority: Low   • Deviated nasal septum 05/02/2014     Priority: Low   • Latex allergy, contact dermatitis 05/02/2014     Priority: Low   • Osteopenia      Priority: Low   • Primary osteoarthritis involving multiple joints      Priority: Low   • Vitamin D deficiency      Priority: Low   • Obesity, Class II, BMI 35-39.9      Priority: Low   • Pure hypercholesterolemia 12/14/2012     Priority: Low   • Essential hypertension 12/14/2012     Priority: Low       PAST MEDICAL, FAMILY AND SOCIAL HISTORY     Medications:  Current Outpatient Medications   Medication   • fluticasone-salmeterol 250-50 MCG/DOSE inhaler   • fluticasone (FLONASE) 50 MCG/ACT nasal spray   • LORazepam (ATIVAN) 1 MG tablet   • furosemide (LASIX) 20 MG tablet   • rosuvastatin (CRESTOR) 20 MG tablet   • propranolol (INDERAL) 60 MG tablet   • pantoprazole (PROTONIX) 40 MG tablet   • albuterol (ProAir HFA) 108 (90 Base) MCG/ACT inhaler   • sertraline (ZOLOFT) 100 MG tablet   • Cholecalciferol (Vitamin D3) 50 mcg (2,000 units) tablet   • rizatriptan (MAXALT-MLT) 10 MG disintegrating tablet   • carboxymethylcellulose-glycerin 0.5-0.9 % ophthalmic solution   • acetaminophen (TYLENOL) 500 MG tablet   • clindamycin (CLEOCIN T) 1 % lotion   • vitamin B-12 (CYANOCOBALAMIN) 1000 MCG tablet   • aspirin 81 MG tablet   • docusate sodium/sennosides (SENOKOT S) 8.6-50 MG per tablet     No current facility-administered medications for this visit.       Allergies:  ALLERGIES:   Allergen Reactions   • Latex   (Environmental) RASH     Contact dermatitis       Past Medical  History/Surgeries:  Past Medical History:   Diagnosis Date   • Acid reflux     • Anxiety    • Asthma 07/02/2015   • Basal cell carcinoma     nose and lower abdomen   • Bronchitis    • Chronic heart failure with preserved ejection fraction (HFpEF) (CMS/[facility])    • Chronic insomnia    • Chronic low back pain    • Chronic neck pain    • Chronic sinusitis    • CKD (chronic kidney disease) stage 3, GFR 30-59 ml/min (CMS/[facility]) 04/01/2016   • Decreased diffusion capacity of lung 05/21/2015   • Depression    • Deviated septum    • Dry eye syndrome    • Esophageal hernia    • Fracture     hx of fractured ankle and foot   • Glaucoma    • Hyperlipidemia    • Hypertension    • Left carpal tunnel syndrome    • Low back pain    • Migraine headaches    • Mild to moderate mitral regurgitation 06/04/2015   • Mild tricuspid regurgitation 06/04/2015   • Obesity    • Obstructive sleep apnea 06/13/2016    Mild per sleep study 6/13/2016. Uses BiPAP.   • Osteoarthritis    • Osteopenia    • Pseudophakia of both eyes     w/ PC IOL, both eyes   • Pulmonary hypertension due to left ventricular diastolic dysfunction (CMS/[facility])    • PVD (posterior vitreous detachment), both eyes    • S/P YAG capsulotomy     OD x 2  (2007 & 2010)   • Vitamin D deficiency        Past Surgical History:   Procedure Laterality Date   • Appendectomy  05/03/2007   • Blepharoplasty upper bilateral 1.5 hrs Bilateral 04/03/2018    Bilateral upper blepharoplasty,  Bilateral lateral tarsal strip repair of ectropion - Dr. Forbes           • Carpal tunnel release Left 06/25/2008   • Cataract extraction w/ intraocular lens implant     • Close rx dist fingr fx  11/28/2004   • Colonoscopy  10/02/2013    Ascending polyp, melanosis coli, internal hemorrhoids.  Follow up in 5 years. Dr. Galaviz.   • Colonoscopy  09/28/2010   • Colonoscopy  11/16/2018    Three polyps, melanosis coli, internal hemorrhoids, follow up in 5 years.   • Esophagogastroduodenoscopy  03/06/2020    GERD   • Esophagoscopy  03/03/2020    around the 3rd, patient unsure date   • Extracapsular  cataract removal w insert io lens prosth wo ecp Left 01/09/2006   • Extracapsular cataract removal w insert io lens prosth wo ecp Right 12/19/2005   • Eye surgery      ALT x 2: OU    • Fusion big toe,mt-p jt Right 11/06/2013    Right first metatarsophalangeal joint fusion. Dr. Rolo Jones.   • Joint replacement     • Knee arthroscopy w/ meniscectomy Left 09/14/2005   • Nasal septoplasty w/ turbinoplasty  07/10/2014    Septoplasty. Bilateral submucous resection of the inferior turbinates. Dr. Rolo Wilder.   • Total knee arthroplasty Right 02/26/2007       Family History:  Family History   Problem Relation Age of Onset   • Cancer, Lung Father    • Arthritis Father    • Asthma Father    • Cancer Mother         Bladder & Brain   • Hypertension Mother    • COPD Mother    • Diabetes Mother    • Coronary Artery Disease Mother         Coronary Artery Stenting   • Dementia/Alzheimers Mother    • Arthritis Mother    • Asthma Mother    • Cataracts Mother    • Glaucoma Mother    • Osteoarthritis Sister    • Hypertension Sister    • Systemic Lupus Erythematosus Sister    • Myocardial Infarction Sister        Social History:  Social History     Tobacco Use   • Smoking status: Never Smoker   • Smokeless tobacco: Never Used   Substance Use Topics   • Alcohol use: Yes     Alcohol/week: 2.0 standard drinks     Types: 2 Glasses of wine per week     Comment: little       REVIEW OF SYSTEMS     Review of Systems   Constitutional: Negative.    HENT: Negative.    Respiratory: Negative.    Cardiovascular: Negative.    Gastrointestinal: Negative.    Endocrine: Negative.    Genitourinary: Negative.    Skin: Negative.    Neurological: Positive for headaches.   Psychiatric/Behavioral: Negative.        PHYSICAL EXAM     Physical Exambilateral sacroiliac tenderness, unable to do william and ganeslens due to pain  ASSESSMENT/PLAN   ASSESSMENT:   1. Lumbar spondylosis    2. SI (sacroiliac) joint inflammation (CMS/HCC)    3. Intractable back  pain    4. Spinal stenosis of lumbar region with neurogenic claudication      PLAN:  1.  The pathophysiology of the patient's pain symptoms were discussed in detail. Different treatment options were discussed. Bilateral sacroiliac joint injection  2. Counseled on non medical management of pain including heat, ice, stretching, TENS, exercises and non opiate medications.  3. Goals: Improved functionality  4.  Follow up after procedure  No orders of the defined types were placed in this encounter.    ASA: 3    Risks, benefits and alternative options discussed with the patient. All questions were answered to her complete satisfaction. Patient demonstrated complete understanding and agreed to proceed with the treatment plan.       Hypothyroidism Dementia

## 2024-05-07 NOTE — DIETITIAN INITIAL EVALUATION ADULT - OTHER CALCULATIONS
Estimated needs based on dosing wt as no ht available to determine IBW at this time. Needs adjusted for age and clinical status.

## 2024-05-07 NOTE — DISCHARGE NOTE PROVIDER - CARE PROVIDER_API CALL
Nabila Murphy  Internal Medicine  [address]  [place] [address]  Phone: [phone]  Fax: [phone]  Follow Up Time: 2 weeks

## 2024-05-07 NOTE — EEG REPORT - NS EEG TEXT BOX
[facility] Department of Neurology  Inpatient Continuous video-Electroencephalogram    Patient Name:	CASEY WAGGONER    :	1932  MRN:	[id number]    Study Start Date/Time:  2024, 4:39:43 PM  Study End Date/Time: in progress    Referred by: Dr. Goncalves    Brief Clinical History:  CASEY WAGGONER is a 89 year old Female a/w presumed toxic metabolic encephalopathy; study performed to investigate for seizures or markers of epilepsy.    Diagnosis Code:   R56.9 convulsions/seizure  The live video was: unmonitored.    Pertinent Medications:  n/a    Acquisition Details:  Electroencephalography was acquired using a minimum of 21 channels on an [employer] system v 9.3.1 with electrode placement according to the standard International 10-20 system following ACNS (American Clinical Neurophysiology Society) guidelines for Long-Term Video EEG monitoring.  Anterior temporal T1 and T2 electrodes were utilized whenever possible.   The [employer] automated spike & seizure detections were all reviewed in detail, in addition to extensive portions of raw EEG.      Day 1: 2024 @ 4:39:43 PM to next morning @ 07:00 am  Background:  continuous, with predominantly theta frequencies.  Symmetry:  Frequent (10-49%)right frontotemporal delta slowing.  Posterior Dominant Rhythm:  No clear PDR poorly regulated.  Organization: Rudimentary.  Voltage:  Normal (20+ uV)  Variability: Yes. 		Reactivity: Yes.  N2 sleep: Symmetric, synchronous spindles and K complexes.  Spontaneous Activity:  No epileptiform discharges.  Periodic/rhythmic activity:  Yes: in the early recording a diffuse triphasic wave pattern was noted with sharply contoured delta activity, 1HZ. This appeared to be present during state related changes (more wakeful) and resolved in drowsiness and sleep which is more suggestive of a benign etiology.   Events:  No electrographic seizures or significant clinical events.  Provocations:  Hyperventilation and Photic stimulation: was not performed.    Daily Summary:    Improving mild to moderate generalized background slowing suggestive of a similar degree of diffuse or multifocal  dysfunction.    The presence of triphasic waves is often associated with toxic metabolic derangement; this pattern improved/ resolved by the end of the recording.     Asymmetric slowing over the right frontotemporal area is indicative of non-specific focal cerebral dysfunction; neuroimaging correlation is suggested.     Acacia Felton DO  Attending Neurologist, [facility] Epilepsy [facility]

## 2024-05-07 NOTE — PHYSICAL THERAPY INITIAL EVALUATION ADULT - PERTINENT HX OF CURRENT PROBLEM, REHAB EVAL
89F w/ PMHx of Dementia c/b frequent falls, hypothyroidism, HTN, HLD was BIBEMS after being found down, admitted for toxic metabolic encephalopathy. Collateral obtained from her POA (Armaan Machuca Jr , Esq [phone]) and PCP Dr. Perry ([phone]). Patient had a telephonic visit this past weekend with her PCP and at that time, she was at her baseline (AAOx2-3 and functional). She subsequently had a fall at sometime on 05/05 and was found down by her assistant this morning, which prompted the ED visit. Per POA, patient has recently become more confused and often repeats the same stories for the past few weeks. Of note, the assistant has noticed multiple empty wine bottles in the garbage. Per POA, she ordered 12 bottles of wine x2 in the past 2 weeks. No known history of withdrawal, DT, or seizures. ROS limited by clinical status.

## 2024-05-07 NOTE — DIETITIAN INITIAL EVALUATION ADULT - ADD RECOMMEND
1. Continue liberalized diet. Defer consistency to team/SLP.   >>Encourage & monitor PO intake. [place] dietary preferences as able.   >>If pt with persistently low PO, consider add Ensure Plus High Protein (350kcal, 20g protein) x1/day to promote intake.   2. Continue multivitamin, thiamine, folic acid.   3. Monitor GI tolerance, weight trends, labs, & skin integrity.  4. Defer bowel and pain regimens to team.   5. RD to remain available for diet education/intervention prn.

## 2024-05-07 NOTE — PROGRESS NOTE ADULT - PROBLEM SELECTOR PLAN 5
Known history of dementia. Per POA (Armaan Machuca Jr), patient is conversive and can ambulate, however has slowed movements and takes time to execute plans (going to the bathroom, kitchen, etc). Recently, she has become more forgetful  - Aspiration and fall precautions  - Speech and Swallow evaluation  -  consult for HHA Patient with history of multiple falls in the past few months. Patient was found down after a suspected fall this weekend. Last known well on Saturday.  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 CT cervical - degenerative changes. Likely 2/2 progressive dementia vs. ETOH use. vs. NPH, orthostatic negative  plan  - PT consult  - Pain management: Tylenol 650mg q6 IV PRN, lidocaine patch  - Weight bearing/Fall precautions Patient with history of multiple falls in the past few months. Patient was found down after a suspected fall this weekend. Last known well on Saturday.  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 CT cervical - degenerative changes. Likely 2/2 progressive dementia vs. ETOH use. vs. NPH, orthostatic negative  plan  - PT: CARLOS EDUARDO  - Pain management: Tylenol 650mg q6 IV PRN, lidocaine patch  - Weight bearing/Fall precautions

## 2024-05-07 NOTE — PROGRESS NOTE ADULT - SUBJECTIVE AND OBJECTIVE BOX
OVERNIGHT EVENTS: : vEEG on. Trop 70. 9p CIWA low. No CP/SOB. Repeat EKG equivocal, submm STD in V4/V5. Trop 74, still no CP.     SUBJECTIVE / INTERVAL HPI: Patient seen and examined at bedside.     VITAL SIGNS:  Vital Signs Last 24 Hrs  T(C): 37.2 (07 May 2024 05:58), Max: 37.7 (06 May 2024 10:30)  T(F): 98.9 (07 May 2024 05:58), Max: 99.9 (06 May 2024 10:30)  HR: 72 (07 May 2024 05:58) (72 - 103)  BP: 120/65 (07 May 2024 05:58) (119/57 - 172/64)  BP(mean): --  RR: 18 (07 May 2024 05:58) (18 - 19)  SpO2: 93% (07 May 2024 05:58) (93% - 99%)    Parameters below as of 07 May 2024 05:58  Patient On (Oxygen Delivery Method): room air      I&O's Summary      PHYSICAL EXAM:    General: WDWN  HEENT: NC/AT; PERRL, anicteric sclera; MMM  Neck: supple  Cardiovascular: +S1/S2; RRR  Respiratory: CTA B/L; no W/R/R  Gastrointestinal: soft, NT/ND; +BSx4  Extremities: WWP; no edema, clubbing or cyanosis  Vascular: 2+ radial, DP/PT pulses B/L  Neurological: AAOx3; no focal deficits    MEDICATIONS:  MEDICATIONS  (STANDING):  dextrose 10% Bolus 125 milliLiter(s) IV Bolus once  dextrose 5%. 1000 milliLiter(s) (50 mL/Hr) IV Continuous <Continuous>  dextrose 5%. 1000 milliLiter(s) (100 mL/Hr) IV Continuous <Continuous>  dextrose 50% Injectable 25 Gram(s) IV Push once  dextrose 50% Injectable 12.5 Gram(s) IV Push once  glucagon  Injectable 1 milliGRAM(s) IntraMuscular once  heparin   Injectable 5000 Unit(s) SubCutaneous every 8 hours  insulin lispro (ADMELOG) corrective regimen sliding scale   SubCutaneous at bedtime  insulin lispro (ADMELOG) corrective regimen sliding scale   SubCutaneous three times a day before meals  levothyroxine Injectable 80 MICROGram(s) IV Push at bedtime  sodium chloride 0.9%. 600 milliLiter(s) (75 mL/Hr) IV Continuous <Continuous>  thiamine IVPB 500 milliGRAM(s) IV Intermittent every 8 hours    MEDICATIONS  (PRN):  dextrose Oral Gel 15 Gram(s) Oral once PRN Blood Glucose LESS THAN 70 milliGRAM(s)/deciliter      ALLERGIES:  Allergies    Allergy Status Unknown    Intolerances        LABS:                        13.8   11.27 )-----------( 169      ( 06 May 2024 10:18 )             41.7     05-06    132<L>  |  103  |  21  ----------------------------<  111<H>  4.2   |  18<L>  |  1.17    Ca    8.1<L>      06 May 2024 20:35  Phos  3.4     05-06  Mg     1.6     05-06    TPro  5.3<L>  /  Alb  3.2<L>  /  TBili  0.5  /  DBili  x   /  AST  28  /  ALT  11  /  AlkPhos  73  05-06    PT/INR - ( 06 May 2024 10:18 )   PT: 11.2 sec;   INR: 0.98          PTT - ( 06 May 2024 10:18 )  PTT:25.2 sec  Urinalysis Basic - ( 06 May 2024 20:35 )    Color: x / Appearance: x / SG: x / pH: x  Gluc: 111 mg/dL / Ketone: x  / Bili: x / Urobili: x   Blood: x / Protein: x / Nitrite: x   Leuk Esterase: x / RBC: x / WBC x   Sq Epi: x / Non Sq Epi: x / Bacteria: x      CAPILLARY BLOOD GLUCOSE      POCT Blood Glucose.: 82 mg/dL (06 May 2024 21:38)      RADIOLOGY & ADDITIONAL TESTS: Reviewed.   OVERNIGHT EVENTS: : vEEG on. Trop 70. 9p CIWA low. No CP/SOB. Repeat EKG equivocal, submm STD in V4/V5. Trop 74, still no CP.     SUBJECTIVE / INTERVAL HPI: Patient seen and examined at bedside, able to converse mental status seems to be improving, patient able to have full conversation states that she feel as she was reaching for a book, has one glass of wine yesterday. Patient denies chest pain, sob, n/v/d.    VITAL SIGNS:  Vital Signs Last 24 Hrs  T(C): 37.2 (07 May 2024 05:58), Max: 37.7 (06 May 2024 10:30)  T(F): 98.9 (07 May 2024 05:58), Max: 99.9 (06 May 2024 10:30)  HR: 72 (07 May 2024 05:58) (72 - 103)  BP: 120/65 (07 May 2024 05:58) (119/57 - 172/64)  BP(mean): --  RR: 18 (07 May 2024 05:58) (18 - 19)  SpO2: 93% (07 May 2024 05:58) (93% - 99%)    Parameters below as of 07 May 2024 05:58  Patient On (Oxygen Delivery Method): room air      I&O's Summary      PHYSICAL EXAM:    General: WDWN  HEENT: NC/AT; PERRL, anicteric sclera; MMM  Neck: supple  Cardiovascular: +S1/S2; RRR  Respiratory: CTA B/L; no W/R/R  Gastrointestinal: soft, NT/ND; +BSx4  Extremities: WWP; no edema, clubbing or cyanosis  Vascular: 2+ radial, DP/PT pulses B/L  Neurological: AAOx2-3; no focal deficits    MEDICATIONS:  MEDICATIONS  (STANDING):  dextrose 10% Bolus 125 milliLiter(s) IV Bolus once  dextrose 5%. 1000 milliLiter(s) (50 mL/Hr) IV Continuous <Continuous>  dextrose 5%. 1000 milliLiter(s) (100 mL/Hr) IV Continuous <Continuous>  dextrose 50% Injectable 25 Gram(s) IV Push once  dextrose 50% Injectable 12.5 Gram(s) IV Push once  glucagon  Injectable 1 milliGRAM(s) IntraMuscular once  heparin   Injectable 5000 Unit(s) SubCutaneous every 8 hours  insulin lispro (ADMELOG) corrective regimen sliding scale   SubCutaneous at bedtime  insulin lispro (ADMELOG) corrective regimen sliding scale   SubCutaneous three times a day before meals  levothyroxine Injectable 80 MICROGram(s) IV Push at bedtime  sodium chloride 0.9%. 600 milliLiter(s) (75 mL/Hr) IV Continuous <Continuous>  thiamine IVPB 500 milliGRAM(s) IV Intermittent every 8 hours    MEDICATIONS  (PRN):  dextrose Oral Gel 15 Gram(s) Oral once PRN Blood Glucose LESS THAN 70 milliGRAM(s)/deciliter      ALLERGIES:  Allergies    Allergy Status Unknown    Intolerances        LABS:                        13.8   11.27 )-----------( 169      ( 06 May 2024 10:18 )             41.7     05-06    132<L>  |  103  |  21  ----------------------------<  111<H>  4.2   |  18<L>  |  1.17    Ca    8.1<L>      06 May 2024 20:35  Phos  3.4     05-06  Mg     1.6     05-06    TPro  5.3<L>  /  Alb  3.2<L>  /  TBili  0.5  /  DBili  x   /  AST  28  /  ALT  11  /  AlkPhos  73  05-06    PT/INR - ( 06 May 2024 10:18 )   PT: 11.2 sec;   INR: 0.98          PTT - ( 06 May 2024 10:18 )  PTT:25.2 sec  Urinalysis Basic - ( 06 May 2024 20:35 )    Color: x / Appearance: x / SG: x / pH: x  Gluc: 111 mg/dL / Ketone: x  / Bili: x / Urobili: x   Blood: x / Protein: x / Nitrite: x   Leuk Esterase: x / RBC: x / WBC x   Sq Epi: x / Non Sq Epi: x / Bacteria: x      CAPILLARY BLOOD GLUCOSE      POCT Blood Glucose.: 82 mg/dL (06 May 2024 21:38)      RADIOLOGY & ADDITIONAL TESTS: Reviewed.

## 2024-05-07 NOTE — DISCHARGE NOTE PROVIDER - ATTENDING DISCHARGE PHYSICAL EXAMINATION:
General: WDWN  HEENT: NC/AT   Cardiovascular: +S1/S2; RRR  Respiratory: CTAB  Gastrointestinal: soft, NT/ND   Extremities: WWP; no edema, clubbing or cyanosis  Vascular: 2+ radial pulses b/l   Neurological: AAOx3; but with confabulations

## 2024-05-07 NOTE — PROGRESS NOTE ADULT - CONVERSATION DETAILS
Spoke to lina Finley, who manages case for Armaan Garcianicole Evans (POA). Malia stated that patient had a living will but never filled out a HCP or MOLST, also stated patient "thought she was going to live forever." As per Malia, Armaan Garcianicole Evans is not related to patient but was handling her 's estates and that is how he eventually became her POA.

## 2024-05-07 NOTE — DIETITIAN INITIAL EVALUATION ADULT - PROBLEM SELECTOR PLAN 1
Presents after being found down over the weekend, found this morning. On admission, did not meet SIRS criteria. U/A + bacteria, leuk esterase, 2 WBC, protein, ketones. . S/p CTX and 2L NS. Lactate 2.5. Likely 2/2 ETOH abuse vs. progressive dementia vs. NPH vs. metabolic derangements. 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 CT cervical - degenerative changes  - Obtain Neuro consult, appreciate recs  - Obtain Utox, TSH, B12, Ammonia, Tylenol, ASA levels  - Monitor off Abx as U/A without pyruria  - F/u BCx and UCx  - Obtain vEEG to rule out seizures  - Obtain MRI w/w/o contrast

## 2024-05-07 NOTE — DISCHARGE NOTE PROVIDER - NSDCCPCAREPLAN_GEN_ALL_CORE_FT
PRINCIPAL DISCHARGE DIAGNOSIS  Diagnosis: Fall  Assessment and Plan of Treatment: [facility] while you were admitted to the [facility] we did the work up which entails obtaining orthostatic BP checks, which was normal, we consulted our physical therapist team and they recommended ____, I am happy to say that you do not require medication upon leaving. Thank you for coming into [facility], it has been a pleasure.      SECONDARY DISCHARGE DIAGNOSES  Diagnosis: Altered mental status  Assessment and Plan of Treatment: You came into the hosptial altered, we did the workup and you did not have an infection, we consulted our neurology team and they recommended that we place you on EEG, this device allows us to measure seizure activity, I am happy to say that you did not have any seizures while you were admitted. You will be going home and it seems that you have returned to your baseline. Thank you for coming into [facility], it has been a pleasure.     PRINCIPAL DISCHARGE DIAGNOSIS  Diagnosis: Fall  Assessment and Plan of Treatment: You were admitted to the hosptial due to a fall, while you were admitted to the hosptial we did the work up which entails obtaining orthostatic BP checks, which was normal, we consulted our physical therapist team and they recommended subacute rehab.      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 For that reason we are sending you with a prescription for thiamine as well a multivitamin and folic acid. Please make sure to follow up with your primary care doctor for an MRI outpatient to evalute further causes of your altered mental status.

## 2024-05-07 NOTE — DISCHARGE NOTE PROVIDER - YES NO FOR MLM POSITIVE OR NEGATIVE COVID RESULT
The history and physical has been reviewed and the patient has been examined.  There are no changes to the patient's history or physical condition.  I discussed diagnostic colonoscopy with the patient and her mother. Anesthesia coverage requested due to age less than 18.    
,

## 2024-05-07 NOTE — DIETITIAN INITIAL EVALUATION ADULT - PROBLEM SELECTOR PLAN 5
Known history of dementia. Per POA (Armaan Machuca Jr), patient is conversive and can ambulate, however has slowed movements and takes time to execute plans (going to the bathroom, kitchen, etc). Recently, she has become more forgetful  - Aspiration and fall precautions  - Speech and Swallow evaluation  -  consult for HHA

## 2024-05-07 NOTE — DIETITIAN INITIAL EVALUATION ADULT - PERTINENT MEDS FT
MEDICATIONS  (STANDING):  dextrose 10% Bolus 125 milliLiter(s) IV Bolus once  dextrose 5%. 1000 milliLiter(s) (50 mL/Hr) IV Continuous <Continuous>  dextrose 5%. 1000 milliLiter(s) (100 mL/Hr) IV Continuous <Continuous>  dextrose 50% Injectable 12.5 Gram(s) IV Push once  dextrose 50% Injectable 25 Gram(s) IV Push once  folic acid 1 milliGRAM(s) Oral daily  glucagon  Injectable 1 milliGRAM(s) IntraMuscular once  heparin   Injectable 5000 Unit(s) SubCutaneous every 8 hours  insulin lispro (ADMELOG) corrective regimen sliding scale   SubCutaneous three times a day before meals  insulin lispro (ADMELOG) corrective regimen sliding scale   SubCutaneous at bedtime  lactated ringers. 1000 milliLiter(s) (100 mL/Hr) IV Continuous <Continuous>  levothyroxine 100 MICROGram(s) Oral daily  multivitamin 1 Tablet(s) Oral daily  thiamine IVPB 500 milliGRAM(s) IV Intermittent every 8 hours    MEDICATIONS  (PRN):  dextrose Oral Gel 15 Gram(s) Oral once PRN Blood Glucose LESS THAN 70 milliGRAM(s)/deciliter

## 2024-05-07 NOTE — CONSULT NOTE ADULT - SUBJECTIVE AND OBJECTIVE BOX
Patient is a 89y old  Female who presents with a chief complaint of TME, fall (07 May 2024 06:49)      HPI:  89F w/ PMHx of Dementia c/b frequent falls, hypothyroidism, HTN, HLD was BIBEMS after being found down, admitted for toxic metabolic encephalopathy. Collateral obtained from her POA (Armaan Machuca Jr , Esq [phone]) and PCP Dr. Perry ([phone]). Patient had a telephonic visit this past weekend with her PCP and at that time, she was at her baseline (AAOx2-3 and functional). She subsequently had a fall at sometime on 05/05 and was found down by her assistant this morning, which prompted the ED visit. Per POA, patient has recently become more confused and often repeats the same stories for the past few weeks. Of note, the assistant has noticed multiple empty wine bottles in the garbage. Per POA, she ordered 12 bottles of wine x2 in the past 2 weeks. No known history of withdrawal, DT, or seizures. ROS limited by clinical status.    ED Course:  Vitals: 99.9 F, HR 91, /64, RR 19 (96%) on Room air  Labs: WBC 11.27, Neutrophils 96%, Na 134, BUN 21, Glucose 141, Lactate 2.5, , U/A + bacteria, leuk esterase, 2 WBC, protein, ketones,   Imaging: CXR unremarkable. CTH Parenchymal volume loss and chronic microvessel ischemic changes are identified, Dilated lateral and third ventricles are seen with a normal-appearing fourth ventricle. 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 in the correct clinical setting; CT cervical - Degenerative change involving the cervical spine  Consults: None  Interventions: Tylenol, CTX 1g, 2L NS   (06 May 2024 13:00)    PAST MEDICAL & SURGICAL HISTORY:  Hypertension      HLD (hyperlipidemia)      Hypothyroidism      Dementia        MEDICATIONS  (STANDING):  dextrose 10% Bolus 125 milliLiter(s) IV Bolus once  dextrose 5%. 1000 milliLiter(s) (100 mL/Hr) IV Continuous <Continuous>  dextrose 5%. 1000 milliLiter(s) (50 mL/Hr) IV Continuous <Continuous>  dextrose 50% Injectable 12.5 Gram(s) IV Push once  dextrose 50% Injectable 25 Gram(s) IV Push once  glucagon  Injectable 1 milliGRAM(s) IntraMuscular once  heparin   Injectable 5000 Unit(s) SubCutaneous every 8 hours  insulin lispro (ADMELOG) corrective regimen sliding scale   SubCutaneous three times a day before meals  insulin lispro (ADMELOG) corrective regimen sliding scale   SubCutaneous at bedtime  levothyroxine Injectable 80 MICROGram(s) IV Push at bedtime  thiamine IVPB 500 milliGRAM(s) IV Intermittent every 8 hours    MEDICATIONS  (PRN):  dextrose Oral Gel 15 Gram(s) Oral once PRN Blood Glucose LESS THAN 70 milliGRAM(s)/deciliter        FAMILY HISTORY:      CBC Full  -  ( 06 May 2024 10:18 )  WBC Count : 11.27 K/uL  RBC Count : 4.47 M/uL  Hemoglobin : 13.8 g/dL  Hematocrit : 41.7 %  Platelet Count - Automated : 169 K/uL  Mean Cell Volume : 93.3 fl  Mean Cell Hemoglobin : 30.9 pg  Mean Cell Hemoglobin Concentration : 33.1 gm/dL  Auto Neutrophil # : 10.98 K/uL  Auto Lymphocyte # : 0.09 K/uL  Auto Monocyte # : 0.10 K/uL  Auto Eosinophil # : 0.00 K/uL  Auto Basophil # : 0.00 K/uL  Auto Neutrophil % : 96.5 %  Auto Lymphocyte % : 0.8 %  Auto Monocyte % : 0.9 %  Auto Eosinophil % : 0.0 %  Auto Basophil % : 0.0 %      05-06    132<L>  |  103  |  21  ----------------------------<  111<H>  4.2   |  18<L>  |  1.17    Ca    8.1<L>      06 May 2024 20:35  Phos  3.4     05-06  Mg     1.6     05-06    TPro  5.3<L>  /  Alb  3.2<L>  /  TBili  0.5  /  DBili  x   /  AST  28  /  ALT  11  /  AlkPhos  73  05-06      Urinalysis Basic - ( 06 May 2024 20:35 )    Color: x / Appearance: x / SG: x / pH: x  Gluc: 111 mg/dL / Ketone: x  / Bili: x / Urobili: x   Blood: x / Protein: x / Nitrite: x   Leuk Esterase: x / RBC: x / WBC x   Sq Epi: x / Non Sq Epi: x / Bacteria: x        Radiology :     < from: Xray Chest 1 View- PORTABLE-Urgent (Xray Chest 1 View- PORTABLE-Urgent .) (05.06.24 @ 11:35) >    ACC: [id number] EXAM:  XR CHEST PORTABLE URGENT 1V   ORDERED BY: REBEKAH LOZA     PROCEDURE DATE:  05/06/2024          INTERPRETATION:  Clinical history/reason for exam: AMS.    Frontal chest.    No comparison.    Findings/  impression: Heartsize upper normal limits, thoracic aortic   calcification. Lungs and mediastinum are unremarkable. Thoracic spine   degenerative changes. Bilateral shoulder degenerative changes, bilateral   chronic rotator cuff injuries. Slight elevation of the lefthemidiaphragm.      < from: CT Head No Cont (05.06.24 @ 11:12) >    ACC: [id number] EXAM:  CT CERVICAL SPINE   ORDERED BY: REBEKAH LOZA     ACC: [id number] EXAM:  CT BRAIN   ORDERED BY: REBEKAH LOZA     PROCEDURE DATE:  05/06/2024          INTERPRETATION:  Clinical indications: Altered mental status. Found on   floor.    Multiple axial sections were performed from the base of skull to vertex   without contrast enhancement. Coronal and sagittal reconstructions were   performed    Axial sections were performed through cervical spine. Coronal and   sagittal reconstructions were performed    Brain:    Parenchymal volume loss and chronic microvessel ischemic changes are   identified    Dilated lateral and third ventricles are seen with a normal-appearing   fourth ventricle. 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 in the correct   clinical setting. Please correlate clinically.    No acute hemorrhage mass or mass effect seen    Evaluation of the osseous structures with the appropriate window appears   normal    The visualized paranasal sinuses mastoid and middle ear regions are clear.    Cervical spine:    Loss of the normal cervical lordosis is seen.    Mild scoliosis is seen    Partial fusion of the C5 and C6 vertebral bodies are identified and is   likely the result of chronic degenerative changes.    Disc osteophyte complex is seen with bilateral hypertrophic facet joint   changes.    Bilateral hypertrophic facet change is seen involving multiple levels   secondary chronic degenerative change    No acute fractures or dislocation is seen.    Evaluation of the paraspinal soft tissues is limited likely contrast   though grossly normal    IMPRESSION: Degenerative change involving the cervical spine as above.           Review of Systems : per HPI         Vital Signs Last 24 Hrs  T(C): 37.2 (07 May 2024 05:58), Max: 37.7 (06 May 2024 10:30)  T(F): 98.9 (07 May 2024 05:58), Max: 99.9 (06 May 2024 10:30)  HR: 72 (07 May 2024 05:58) (72 - 103)  BP: 120/65 (07 May 2024 05:58) (119/57 - 172/64)  BP(mean): --  RR: 18 (07 May 2024 05:58) (18 - 19)  SpO2: 93% (07 May 2024 05:58) (93% - 99%)    Parameters below as of 07 May 2024 05:58  Patient On (Oxygen Delivery Method): room air            Physical Exam:  89 y o woman with EEG wrap , lying comfortably in semi Fernández's position , awake , alert , no acute complaints , feels tired     Head: normocephalic , vEEG wrap    Eyes: PERRLA , EOMI , no nystagmus , sclera anicteric    ENT / FACE: neg nasal discharge , uvula midline , no oropharyngeal erythema / exudate    Neck: supple , negative JVD , negative carotid bruits , no thyromegaly    Chest: CTA bilaterally , neg wheeze / rhonchi / rales / crackles / egophany    Cardiovascular: regular rate and rhythm , neg murmurs / rubs / gallops    Abdomen: soft , non distended , no tenderness to palpation in all 4 quadrants ,  normal bowel sounds     Extremities: WWP , neg cyanosis /clubbing / edema     Neurologic Exam:     Alert and oriented to person     Cranial Nerves:           II:                         pupils equal , round and reactive to light , visual fields intact         III/ IV/VI:             extraocular movements intact , neg nystagmus , neg ptosis        V:                        facial sensation intact , V1-3 normal        VII:                      face symmetric , no droop , normal eye closure and smile        VIII:                     hearing intact to finger rub bilaterally        IX and X:             no hoarseness , gag intact , palate/ uvula rise symmetrically        XI:                       SCM / trapezius strength intact bilateral        XII:                      no tongue deviation    Motor Exam:        > 3+/5 x 4 extremities , without drift     Sensation:         intact to light touch x 4 extremities                            no neglect or extinction on double simultaneous testing    DTR:           biceps/brachioradialis: equal                            patella/ankle: equal          neg Babinski     Gait:  not tested         PM&R Impression: admitted fund down at her apartment ,  TME , s/p fall    - no acute pathology on CT imaging      - deconditioned       Recommendations / Plan:       1) Physical / Occupational therapy focusing on therapeutic exercises , equipment evaluation , bed mobility/transfer out of bed evaluation , progressive ambulation with assistive devices prn .    2) Current disposition plan recommendation:    [facility] placement       
Neurology consult    CASEY [id number]    HPI:  89F w/ PMHx of Dementia c/b frequent falls, hypothyroidism, HTN, HLD was BIBEMS after being found down, admitted for toxic metabolic encephalopathy. Collateral obtained from her POA (Armaan Machuca Jr , Esq [phone]) and PCP Dr. Perry ([phone]). Patient had a telephonic visit this past weekend with her PCP and at that time, she was at her baseline (AAOx2-3 and functional). She subsequently had a fall at sometime on  and was found down by her assistant this morning, which prompted the ED visit. Per POA, patient has recently become more confused and often repeats the same stories for the past few weeks. Of note, the assistant has noticed multiple empty wine bottles in the garbage. Per POA, she ordered 12 bottles of wine x2 in the past 2 weeks. No known history of withdrawal, DT, or seizures. ROS limited by clinical status.    ED Course:  Vitals: 99.9 F, HR 91, /64, RR 19 (96%) on Room air  Labs: WBC 11.27, Neutrophils 96%, Na 134, BUN 21, Glucose 141, Lactate 2.5, , U/A + bacteria, leuk esterase, 2 WBC, protein, ketones,   Imaging: CXR unremarkable. CTH Parenchymal volume loss and chronic microvessel ischemic changes are identified, Dilated lateral and third ventricles are seen with a normal-appearing fourth ventricle. 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 in the correct clinical setting; CT cervical - Degenerative change involving the cervical spine  Consults: None  Interventions: Tylenol, CTX 1g, 2L NS   (06 May 2024 13:00)    Interval history: Limited, patient not contributory to history or physical. Somnolent but easily aroused with sternal rub.         MEDICATIONS    dextrose 10% Bolus 125 milliLiter(s) IV Bolus once  dextrose 5%. 1000 milliLiter(s) IV Continuous <Continuous>  dextrose 5%. 1000 milliLiter(s) IV Continuous <Continuous>  dextrose 50% Injectable 12.5 Gram(s) IV Push once  dextrose 50% Injectable 25 Gram(s) IV Push once  dextrose Oral Gel 15 Gram(s) Oral once PRN  glucagon  Injectable 1 milliGRAM(s) IntraMuscular once  insulin lispro (ADMELOG) corrective regimen sliding scale   SubCutaneous at bedtime  insulin lispro (ADMELOG) corrective regimen sliding scale   SubCutaneous three times a day before meals  levothyroxine Injectable 80 MICROGram(s) IV Push at bedtime  thiamine IVPB 500 milliGRAM(s) IV Intermittent every 8 hours        Family history: No history of dementia, strokes, or seizures   FAMILY HISTORY:    SOCIAL HISTORY -- No history of tobacco or alcohol use     Allergies    Allergy Status Unknown    Intolerances          Weight (kg): 58.967 ( @ 11:38)    Vital Signs Last 24 Hrs  T(C): 37.6 (06 May 2024 14:35), Max: 37.7 (06 May 2024 10:30)  T(F): 99.7 (06 May 2024 14:35), Max: 99.9 (06 May 2024 10:30)  HR: 96 (06 May 2024 14:35) (77 - 96)  BP: 133/72 (06 May 2024 14:35) (127/70 - 172/64)  BP(mean): --  RR: 18 (06 May 2024 14:35) (18 - 19)  SpO2: 93% (06 May 2024 14:35) (93% - 96%)    Parameters below as of 06 May 2024 14:35  Patient On (Oxygen Delivery Method): room air      REVIEW OF SYSTEMS:    Constitutional: No fever, chills, fatigue, weakness  Eyes: no eye pain, visual disturbances, or discharge  ENT:  No difficulty hearing, tinnitus, vertigo; No sinus or throat pain  Neck: No pain or stiffness  Respiratory: No cough, dyspnea, wheezing   Cardiovascular: No chest pain, palpitations,   Gastrointestinal: No abdominal or epigastric pain. No nausea, vomiting  No diarrhea or constipation.   Genitourinary: No dysuria, frequency, hematuria or incontinence  Neurological: No headaches, lightheadedness, vertigo, numbness or tremors  Psychiatric: No depression, anxiety, mood swings or difficulty sleeping  Musculoskeletal: No joint pain or swelling; No muscle, back or extremity pain  Skin: No itching, burning, rashes or lesions   Lymph Nodes: No enlarged glands  Endocrine: No heat or cold intolerance; No hair loss, No h/o diabetes or thyroid dysfunction  Allergy and Immunologic: No hives or eczema    General: NAD, laying in bed,   HEENT: head NC/AT, no conjunctival injection, EOMI, MMM  Neck: supple, no JVD  Cardio: RRR, +S1/S2, no M/R/G  Resp: lungs CTAB, no cough/wheezes/rales/rhonchi, on room air  Abdo: soft, NT, ND, +bowel sounds x4, no organomegaly or palpable mass    Extremities: +thin LE with vascular changes  Vasc: 2+ radial and DP pulses b/l  Psych: speech non-pressured, thoughts goal-oriented  Skin: dry, intact, no visible jaundice   MSK: no joint swelling    On Neurological Examination:    Mental Status - Neuro: A&Ox0, opens eyes but unable to track movement meaningfully. Spontaneously moves all extremities and winces to noxious stimuli  Does not follow commands  Speech -  incoherent speech     Cranial Nerves - Pupils 3 mm equal and reactive to light, does not track for EOM    Motor Exam - Unable to test.   With stimuli positive movement of all 4 extremities    Muscle tone - is normal all over. No asymmetry is seen.      Sensory - unable to test     Gait - Not tested   Babinski elicits plantar flexion of toes            LABS:  CBC Full  -  ( 06 May 2024 10:18 )  WBC Count : 11.27 K/uL  RBC Count : 4.47 M/uL  Hemoglobin : 13.8 g/dL  Hematocrit : 41.7 %  Platelet Count - Automated : 169 K/uL  Mean Cell Volume : 93.3 fl  Mean Cell Hemoglobin : 30.9 pg  Mean Cell Hemoglobin Concentration : 33.1 gm/dL  Auto Neutrophil # : 10.98 K/uL  Auto Lymphocyte # : 0.09 K/uL  Auto Monocyte # : 0.10 K/uL  Auto Eosinophil # : 0.00 K/uL  Auto Basophil # : 0.00 K/uL  Auto Neutrophil % : 96.5 %  Auto Lymphocyte % : 0.8 %  Auto Monocyte % : 0.9 %  Auto Eosinophil % : 0.0 %  Auto Basophil % : 0.0 %    Urinalysis Basic - ( 06 May 2024 10:18 )    Color: Yellow / Appearance: Clear / S.016 / pH: x  Gluc: 141 mg/dL / Ketone: 15 mg/dL  / Bili: Negative / Urobili: 1.0 mg/dL   Blood: x / Protein: 300 mg/dL / Nitrite: Positive   Leuk Esterase: Negative / RBC: 4 /HPF / WBC 2 /HPF   Sq Epi: x / Non Sq Epi: 0 /HPF / Bacteria: Many /HPF      05-06    x   |  x   |  x   ----------------------------<  x   4.4   |  x   |  x     Ca    9.4      06 May 2024 10:18    TPro  6.8  /  Alb  4.1  /  TBili  0.7  /  DBili  x   /  AST  see note  /  ALT  see note  /  AlkPhos  95  -06    Hemoglobin A1C:     LIVER FUNCTIONS - ( 06 May 2024 10:18 )  Alb: 4.1 g/dL / Pro: 6.8 g/dL / ALK PHOS: 95 U/L / ALT: see note U/L / AST: see note U/L / GGT: x           Vitamin B12   PT/INR - ( 06 May 2024 10:18 )   PT: 11.2 sec;   INR: 0.98          PTT - ( 06 May 2024 10:18 )  PTT:25.2 sec      RADIOLOGY    EKG

## 2024-05-07 NOTE — PHYSICAL THERAPY INITIAL EVALUATION ADULT - BED MOBILITY LIMITATIONS, REHAB EVAL
Slightly retropulsive sitting at EOB - required assist at times to maintain erect sitting balance./decreased ability to use legs for bridging/pushing/impaired ability to control trunk for mobility

## 2024-05-07 NOTE — PROGRESS NOTE ADULT - PROBLEM SELECTOR PLAN 2
Patient with recent increase in ETOH consumption per POA. Home frequently seen to have empty bottles of ETOH in her garbage during routine check-ins. Furthermore, she may be masking amount of ETOH consumption from assistant personel who routinely checks on her. No known history of DTs or seizures.   - CIWA cannot be performed due to patient's current mental status.  - Last drink: unknown  - c/w CIWA protocol, ativan 2mg PRN for CIWA > 8 p  - give thiamine [id number] for 3 days, then 250mg daily  - multivitamin and folic acid daily when able to tolerate PO  - monitor EKG with QT prolonging meds  - fall precautions Patient with recent increase in ETOH consumption per POA. Home frequently seen to have empty bottles of ETOH in her garbage during routine check-ins. Furthermore, she may be masking amount of ETOH consumption from assistant personel who routinely checks on her. No known history of DTs or seizures.   - CIWA cannot be performed due to patient's current mental status.  - Last drink: unknown  plan  - c/w CIWA protocol, ativan 2mg PRN for CIWA > 8 p  - give thiamine [id number] for 3 days, then 250mg daily  - multivitamin and folic acid daily when able to tolerate PO  - monitor EKG with QT prolonging meds  - fall precautions - patient presents with Cr 1.04, this Am Cr selina to 1.37 s/p 2 L, UA negative for cast concerning, however did not respond to fliuds, and has elevated CPK levels that is now downtrending concerning for ATN vs pre-renal  plan  - will start  cc/hr  - will order urine studies  - renally dose medications  - Avoid nephrotoxic agents

## 2024-05-07 NOTE — DIETITIAN INITIAL EVALUATION ADULT - OTHER INFO
89F with PMH of dementia complicated by frequent falls, hypothyroidism, HTN, and HLD who presented after being found down, admitted for toxic metabolic encephalopathy.     Pt seen on 7UR for assessment. Labs and medication orders reviewed. Ordered for lactated ringers, folic acid, PO synthroid, multivitamin, and IV thiamine. Electrolytes WNL, POC blood glucose (5/6-5/7) . On Regular diet (advanced 5/7), pt passed bedside swallow test per MD 5/7, SLP eval discontinued. Pt fatigued and confused this AM. Pt discussed in interdisciplinary team rounds, pt mental status improved status post thiamine. RD observed pt with no overt signs of wasting. No nausea/vomiting/diarrhea/constipation noted, last recorded BM 5/6. No known food allergies. No Congregation/ethnic/cultural food preferences noted. No pressure injuries or edema documented, shin laceration noted, Delfino score 11. See nutrition recommendations. RD to remain available.

## 2024-05-08 LAB
ANION GAP SERPL CALC-SCNC: 8 MMOL/L — SIGNIFICANT CHANGE UP (ref 5–17)
BASOPHILS # BLD AUTO: 0.02 K/UL — SIGNIFICANT CHANGE UP (ref 0–0.2)
BASOPHILS NFR BLD AUTO: 0.3 % — SIGNIFICANT CHANGE UP (ref 0–2)
BUN SERPL-MCNC: 24 MG/DL — HIGH (ref 7–23)
CALCIUM SERPL-MCNC: 8.7 MG/DL — SIGNIFICANT CHANGE UP (ref 8.4–10.5)
CHLORIDE SERPL-SCNC: 110 MMOL/L — HIGH (ref 96–108)
CO2 SERPL-SCNC: 22 MMOL/L — SIGNIFICANT CHANGE UP (ref 22–31)
CREAT SERPL-MCNC: 1.38 MG/DL — HIGH (ref 0.5–1.3)
EGFR: 36 ML/MIN/1.73M2 — LOW
EOSINOPHIL # BLD AUTO: 0.1 K/UL — SIGNIFICANT CHANGE UP (ref 0–0.5)
EOSINOPHIL NFR BLD AUTO: 1.7 % — SIGNIFICANT CHANGE UP (ref 0–6)
GLUCOSE BLDC GLUCOMTR-MCNC: 116 MG/DL — HIGH (ref 70–99)
GLUCOSE BLDC GLUCOMTR-MCNC: 135 MG/DL — HIGH (ref 70–99)
GLUCOSE BLDC GLUCOMTR-MCNC: 84 MG/DL — SIGNIFICANT CHANGE UP (ref 70–99)
GLUCOSE BLDC GLUCOMTR-MCNC: 98 MG/DL — SIGNIFICANT CHANGE UP (ref 70–99)
GLUCOSE SERPL-MCNC: 88 MG/DL — SIGNIFICANT CHANGE UP (ref 70–99)
HCT VFR BLD CALC: 37 % — SIGNIFICANT CHANGE UP (ref 34.5–45)
HGB BLD-MCNC: 12 G/DL — SIGNIFICANT CHANGE UP (ref 11.5–15.5)
IMM GRANULOCYTES NFR BLD AUTO: 0.2 % — SIGNIFICANT CHANGE UP (ref 0–0.9)
LYMPHOCYTES # BLD AUTO: 1.43 K/UL — SIGNIFICANT CHANGE UP (ref 1–3.3)
LYMPHOCYTES # BLD AUTO: 23.8 % — SIGNIFICANT CHANGE UP (ref 13–44)
MAGNESIUM SERPL-MCNC: 2.1 MG/DL — SIGNIFICANT CHANGE UP (ref 1.6–2.6)
MCHC RBC-ENTMCNC: 30.3 PG — SIGNIFICANT CHANGE UP (ref 27–34)
MCHC RBC-ENTMCNC: 32.4 GM/DL — SIGNIFICANT CHANGE UP (ref 32–36)
MCV RBC AUTO: 93.4 FL — SIGNIFICANT CHANGE UP (ref 80–100)
MONOCYTES # BLD AUTO: 0.62 K/UL — SIGNIFICANT CHANGE UP (ref 0–0.9)
MONOCYTES NFR BLD AUTO: 10.3 % — SIGNIFICANT CHANGE UP (ref 2–14)
NEUTROPHILS # BLD AUTO: 3.84 K/UL — SIGNIFICANT CHANGE UP (ref 1.8–7.4)
NEUTROPHILS NFR BLD AUTO: 63.7 % — SIGNIFICANT CHANGE UP (ref 43–77)
NRBC # BLD: 0 /100 WBCS — SIGNIFICANT CHANGE UP (ref 0–0)
PHOSPHATE SERPL-MCNC: 3.2 MG/DL — SIGNIFICANT CHANGE UP (ref 2.5–4.5)
PLATELET # BLD AUTO: 125 K/UL — LOW (ref 150–400)
POTASSIUM SERPL-MCNC: 4 MMOL/L — SIGNIFICANT CHANGE UP (ref 3.5–5.3)
POTASSIUM SERPL-SCNC: 4 MMOL/L — SIGNIFICANT CHANGE UP (ref 3.5–5.3)
RBC # BLD: 3.96 M/UL — SIGNIFICANT CHANGE UP (ref 3.8–5.2)
RBC # FLD: 12.9 % — SIGNIFICANT CHANGE UP (ref 10.3–14.5)
SODIUM SERPL-SCNC: 140 MMOL/L — SIGNIFICANT CHANGE UP (ref 135–145)
WBC # BLD: 6.02 K/UL — SIGNIFICANT CHANGE UP (ref 3.8–10.5)
WBC # FLD AUTO: 6.02 K/UL — SIGNIFICANT CHANGE UP (ref 3.8–10.5)

## 2024-05-08 PROCEDURE — 95720 EEG PHY/QHP EA INCR W/VEEG: CPT

## 2024-05-08 PROCEDURE — 99233 SBSQ HOSP IP/OBS HIGH 50: CPT | Mod: GC

## 2024-05-08 RX ORDER — SENNA PLUS 8.6 MG/1
2 TABLET ORAL AT BEDTIME
Refills: 0 | Status: DISCONTINUED | OUTPATIENT
Start: 2024-05-08 | End: 2024-05-09

## 2024-05-08 RX ORDER — THIAMINE MONONITRATE (VIT B1) 100 MG
500 TABLET ORAL ONCE
Refills: 0 | Status: COMPLETED | OUTPATIENT
Start: 2024-05-08 | End: 2024-05-08

## 2024-05-08 RX ORDER — THIAMINE MONONITRATE (VIT B1) 100 MG
250 TABLET ORAL DAILY
Refills: 0 | Status: DISCONTINUED | OUTPATIENT
Start: 2024-05-09 | End: 2024-05-09

## 2024-05-08 RX ORDER — THIAMINE MONONITRATE (VIT B1) 100 MG
1 TABLET ORAL
Qty: 30 | Refills: 11
Start: 2024-05-08 | End: 2025-05-02

## 2024-05-08 RX ORDER — THIAMINE MONONITRATE (VIT B1) 100 MG
1 TABLET ORAL
Qty: 90 | Refills: 3
Start: 2024-05-08 | End: 2025-05-02

## 2024-05-08 RX ADMIN — SENNA PLUS 2 TABLET(S): 8.6 TABLET ORAL at 22:48

## 2024-05-08 RX ADMIN — HEPARIN SODIUM 5000 UNIT(S): 5000 INJECTION INTRAVENOUS; SUBCUTANEOUS at 22:48

## 2024-05-08 RX ADMIN — Medication 1 MILLIGRAM(S): at 14:25

## 2024-05-08 RX ADMIN — Medication 105 MILLIGRAM(S): at 09:38

## 2024-05-08 RX ADMIN — Medication 100 MICROGRAM(S): at 06:23

## 2024-05-08 RX ADMIN — Medication 1 TABLET(S): at 14:24

## 2024-05-08 RX ADMIN — Medication 105 MILLIGRAM(S): at 00:05

## 2024-05-08 RX ADMIN — HEPARIN SODIUM 5000 UNIT(S): 5000 INJECTION INTRAVENOUS; SUBCUTANEOUS at 14:24

## 2024-05-08 RX ADMIN — HEPARIN SODIUM 5000 UNIT(S): 5000 INJECTION INTRAVENOUS; SUBCUTANEOUS at 06:23

## 2024-05-08 RX ADMIN — SODIUM CHLORIDE 100 MILLILITER(S): 9 INJECTION, SOLUTION INTRAVENOUS at 18:27

## 2024-05-08 RX ADMIN — Medication 105 MILLIGRAM(S): at 17:46

## 2024-05-08 RX ADMIN — SODIUM CHLORIDE 100 MILLILITER(S): 9 INJECTION, SOLUTION INTRAVENOUS at 06:22

## 2024-05-08 NOTE — PROGRESS NOTE ADULT - PROBLEM SELECTOR PLAN 7
Known history of dementia. Per POA (Armana Machuca Jr), patient is conversive and can ambulate, however has slowed movements and takes time to execute plans (going to the bathroom, kitchen, etc). Recently, she has become more forgetful  - SW consult for [facility]

## 2024-05-08 NOTE — PROGRESS NOTE ADULT - ASSESSMENT
{\rtf1\ureuhv00212\ansi\yvjebqa7193\ftnbj\uc1\deff0  {\fonttbl{\f0 \fnil Segoe UI;}{\f1 \fnil \fcharset0 Segoe UI;}{\f2 \fnil Times New Marcial;}}  {\colortbl ;\fik235\mrioc012\oldy576 ;\red0\green0\blue0 ;\red0\green0\wakg789 ;\red0\green0\blue0 ;}  {\stylesheet{\f0\fs20 Normal;}{\cs1 Default Paragraph Font;}{\cs2\f0\fs16 Line Number;}{\cs3\f2\fs24\ul\cf3 Hyperlink;}}  {\*\revtbl{Unknown;}}  \njhslg82631\bixcfn16270\fawwn7502\hbpzj2689\jfltg3644\ygwox7092\txgtkmt470\xykcqsx662\nogrowautofit\lwnjxt548\formshade\nofeaturethrottle1\dntblnsbdb\fet4\aendnotes\aftnnrlc\pgbrdrhead\pgbrdrfoot  \sectd\drqoko73820\sxmvwp09335\guttersxn0\oonubqyc5486\urnuorfm2915\mzgkwyrk0602\ysbqmfdd7012\hxbbmhk581\uyqltdb054\sbkpage\pgncont\pgndec  \plain\plain\f0\fs24\ql\plain\f0\fs24\plain\f0\fs20\ddow3071\hich\f0\dbch\f0\loch\f0\fs20\par  I M\par  \par  89 y o F w/ PMHx of Dementia c/b frequent falls, hypothyroidism, HTN, HLD was BIBEMS after being found down, admitted for toxic metabolic encephalopathy.\par  \par  \plain\f1\fs20\fian9262\hich\f1\dbch\f1\loch\f1\cf2\fs20\ul{\field{\*\fldinst HYPERLINK [id number] }{\fldrslt Problem/Plan - 1:}}\plain\f0\fs20\gfsw1827\hich\f0\dbch\f0\loch\f0\fs20\ql\par  \'b7  {\*\bkmkstart bf83794491762}{\*\bkmkend nw09160844254}Problem: {\*\bkmkstart zy87550017605}{\*\bkmkend io07413388054}Metabolic encephalopathy. \par  \'b7  {\*\bkmkstart du48226695671}{\*\bkmkend hg71676939289}Plan: {\*\bkmkstart ax75308220166}{\*\bkmkend ba75318943747}Etiology most likely in setting of wernickes vs less likely toxic metabolic \par  Presents after being found down over the weekend, found this morning. On admission, did not meet SIRS criteria. U/A + bacteria, leuk esterase, 2 WBC, protein, ketones. . S/p CTX and 2L NS. Lactate 2.5. Likely 2/2 ETOH abuse vs. progressive dementia   vs. NPH vs. metabolic derangements. 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 CT cervical - degenerative changes\par  - EEG showing triphasic waves associated with toxic metabolic derangement which improved/ resolved by the end of the recording, as well as asymmetric slowing over the right frontotemporal area indicative of non-specific focal cerebral dysfunction.\par  plan\par  resolved \par  - per Neuro: MRI outpatient\par  - Monitor off Abx, repeat UA clear.\plain\f1\fs20\usni3010\hich\f1\dbch\f1\loch\f1\cf2\fs20\strike\plain\f0\fs20\jyjl2340\hich\f0\dbch\f0\loch\f0\fs20\par  \par  \plain\f1\fs20\vcum2241\hich\f1\dbch\f1\loch\f1\cf2\fs20\ul{\field{\*\fldinst HYPERLINK [id number] }{\fldrslt Problem/Plan - 2:}}\plain\f0\fs20\uuqu3693\hich\f0\dbch\f0\loch\f0\fs20\ql\par  \'b7  {\*\bkmkstart em15164352954}{\*\bkmkend mo00268049836}Problem: {\*\bkmkstart ca23115715422}{\*\bkmkend yx87467538230}SUSHMA (acute kidney injury). \par  \'b7  {\*\bkmkstart lk70679367552}{\*\bkmkend oq43571615364}Plan: {\*\bkmkstart ru34266307838}{\*\bkmkend tu72321162800}- patient presents with Cr 1.04, this Am Cr selina to 1.37 s/p 2 L, UA negative for cast concerning, however did not respond to fliuds,   and has elevated CPK levels that is now downtrending concerning for ATN vs pre-renal, urine studies pre-renal\par  plan\par  - will c/w  cc/hr\par  - renally dose medications\par  - Avoid nephrotoxic agents.\plain\f1\fs20\sbrs5748\hich\f1\dbch\f1\loch\f1\cf2\fs20\strike\plain\f0\fs20\iecl7649\hich\f0\dbch\f0\loch\f0\fs20\par  \par  \plain\f1\fs20\eljh0449\hich\f1\dbch\f1\loch\f1\cf2\fs20\ul{\field{\*\fldinst HYPERLINK [id number] }{\fldrslt Problem/Plan - 3:}}\plain\f0\fs20\eteg6321\hich\f0\dbch\f0\loch\f0\fs20\ql\par  \'b7  {\*\bkmkstart zz54220556841}{\*\bkmkend zt95821629848}Problem: {\*\bkmkstart ds10779387187}{\*\bkmkend mg71799877387}Elevated troponin level. \par  \'b7  {\*\bkmkstart il28841535279}{\*\bkmkend qk44702610684}Plan: {\*\bkmkstart nv79305420006}{\*\bkmkend yi22164561644}Patient presents with elevated troponin most likely i/s/o of type 2 demand ischemia\par  - Trop plateau then downtrended\par  - Patient denies chest pain on exam, EKG: shows possible type 1 block, no signs of ischemia\par  plan\par  resolved.\par  \par  \plain\f1\fs20\opjj2612\hich\f1\dbch\f1\loch\f1\cf2\fs20\ul{\field{\*\fldinst HYPERLINK [id number] }{\fldrslt Problem/Plan - 4:}}\plain\f0\fs20\waic4549\hich\f0\dbch\f0\loch\f0\fs20\ql\par  \'b7  {\*\bkmkstart wc49782547781}{\*\bkmkend lx99977781371}Problem: {\*\bkmkstart fg93680803761}{\*\bkmkend yl76980006744}ETOH abuse. \par  \'b7  {\*\bkmkstart xj47580017805}{\*\bkmkend ic26878072351}Plan: {\*\bkmkstart tz87211499299}{\*\bkmkend uf16702846510}Patient with recent increase in ETOH consumption per POA. Home frequently seen to have empty bottles of ETOH in her garbage during   routine check-ins. Furthermore, she may be masking amount of ETOH consumption from assistant personel who routinely checks on her. No known history of DTs or seizures. \par  - CIWA cannot be performed due to patient's current mental status.\par  - Last drink: unknown\par  plan\par  - c/w CIWA protocol, ativan 2mg PRN for CIWA > 8 p\par  - give thiamine [id number] for 3 days, then 250mg daily\par  - multivitamin and folic acid daily when able to tolerate PO\par  - monitor EKG with QT prolonging meds\par  - fall precautions.\par  \par  \plain\f1\fs20\mngr7629\hich\f1\dbch\f1\loch\f1\cf2\fs20\ul{\field{\*\fldinst HYPERLINK [id number] }{\fldrslt Problem/Plan - 5:}}\plain\f0\fs20\pmai0178\hich\f0\dbch\f0\loch\f0\fs20\ql\par  \'b7  {\*\bkmkstart xq25624859392}{\*\bkmkend ck67882596777}Problem: {\*\bkmkstart mr43433048001}{\*\bkmkend we76769250524}Fall. \par  \'b7  {\*\bkmkstart kj01736124507}{\*\bkmkend bc96727470895}Plan: {\*\bkmkstart dz58717670526}{\*\bkmkend gq05057303497}Patient with history of multiple falls in the past few months. Patient was found down after a suspected fall this weekend. Last known   well on Saturday.  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 CT cervical - degenerative changes. Likely 2/2 progressive dementia vs. ETOH use. vs. NPH, orthostatic negative\par  plan\par  - PT: CARLOS EDUARDO\par  - Pain management: Tylenol 650mg q6 IV PRN, lidocaine patch\par  - Weight bearing/Fall precautions.\par  \par  \plain\f1\fs20\bedh7640\hich\f1\dbch\f1\loch\f1\cf2\fs20\ul{\field{\*\fldinst HYPERLINK [id number] }{\fldrslt Problem/Plan - 6:}}\plain\f0\fs20\djjk3730\hich\f0\dbch\f0\loch\f0\fs20\ql\par  \'b7  {\*\bkmkstart fe83405151890}{\*\bkmkend yw43495773208}Problem: {\*\bkmkstart qd65219185033}{\*\bkmkend mw37319974372}Hypertension. \par  \'b7  {\*\bkmkstart vu40256252965}{\*\bkmkend dm54648156483}Plan: {\*\bkmkstart ol97733942066}{\*\bkmkend xp54219067645}On admission, presented with /64. Repeat 120/60. On home HCTZ-Losartan 100mg-12.5mg, Atenolol 50mg, and Lasix 40mg PO \par  plan\par  - c/w home HCTZ-Losartan 100mg-12.5mg and Atenolol 50mg \par  - Hold Lasix 40mg PO for now as patient is currently euvolemic, restart as clinically indicated.\par  \par  \plain\f1\fs20\szsi5718\hich\f1\dbch\f1\loch\f1\cf2\fs20\ul{\field{\*\fldinst HYPERLINK [id number] }{\fldrslt Problem/Plan - 7:}}\plain\f0\fs20\rzgl0638\hich\f0\dbch\f0\loch\f0\fs20\ql\par  \'b7  {\*\bkmkstart ou01553102510}{\*\bkmkend un16640480890}Problem: {\*\bkmkstart nb53326869540}{\*\bkmkend be39243354342}Dementia. \par  \'b7  {\*\bkmkstart uw68376919083}{\*\bkmkend pz48272959604}Plan: {\*\bkmkstart xv21293711304}{\*\bkmkend ux97250230232}Known history of dementia. Per POA (Armaan Machuca ), patient is conversive and can ambulate, however has slowed movements and takes   time to execute plans (going to the bathroom, kitchen, etc). Recently, she has become more forgetful\par  - SW consult for A.\par  \par  \plain\f1\fs20\kfdy2306\hich\f1\dbch\f1\loch\f1\cf2\fs20\ul{\field{\*\fldinst HYPERLINK [id number] }{\fldrslt Problem/Plan - 8:}}\plain\f0\fs20\ooyy1772\hich\f0\dbch\f0\loch\f0\fs20\ql\par  \'b7  {\*\bkmkstart ir28743846670}{\*\bkmkend xr34535731556}Problem: {\*\bkmkstart hp74558764563}{\*\bkmkend zt30828554940}HLD (hyperlipidemia). \par  \'b7  {\*\bkmkstart qt11434576273}{\*\bkmkend cx74450007079}Plan: {\*\bkmkstart yj12422605217}{\*\bkmkend go80057898592}Known history of HLD. On home Simvastatin 40mg\par  - C/w therapeutic interchange Lipitor.\par  \par  \plain\f1\fs20\xhnh3064\hich\f1\dbch\f1\loch\f1\cf2\fs20\ul{\field{\*\fldinst HYPERLINK [id number] }{\fldrslt Problem/Plan - 9:}}\plain\f0\fs20\oszh2090\hich\f0\dbch\f0\loch\f0\fs20\ql\par  \'b7  {\*\bkmkstart yg40399879839}{\*\bkmkend yg69810316401}Problem: {\*\bkmkstart sy13856914643}{\*\bkmkend xt27140663984}Hypothyroidism. \par  \'b7  {\*\bkmkstart fw46418209957}{\*\bkmkend es66559690012}Plan: {\*\bkmkstart qd64071029750}{\*\bkmkend wg81419133886}Known history of hypothyroidism. On home Synthroid 100mcg\par  - C/w Synthroid 100mcg.\par  \par  \plain\f1\fs20\uyzp3333\hich\f1\dbch\f1\loch\f1\cf2\fs20\ul{\field{\*\fldinst HYPERLINK [id number] }{\fldrslt Problem/Plan - 10:}}\plain\f0\fs20\yper7844\hich\f0\dbch\f0\loch\f0\fs20\ql\par  \'b7  {\*\bkmkstart uh10000625096}{\*\bkmkend mv66331443824}Problem: {\*\bkmkstart kp23266249485}{\*\bkmkend sb37782125143}Need for prophylactic measure. \par  \'b7  {\*\bkmkstart vg48989360666}{\*\bkmkend fn05418168570}Plan; {\*\bkmkstart fo52755823493}{\*\bkmkend mp74806546263}F: s/p 2L NS in the ED \par  E: replete K<4, Mg<2\par  N: regular\par  VTE Prophylaxis: Heparin SubQ\par  C: Full Code\par  D: RMF.\plain\f1\fs16\mlva9951\hich\f1\dbch\f1\loch\f1\cf2\fs16\par  \plain\f0\fs20\cbzd3129\hich\f0\dbch\f0\loch\f0\fs20\par  }

## 2024-05-08 NOTE — EEG REPORT - NS EEG TEXT BOX
[facility] Department of Neurology  Inpatient Continuous video-Electroencephalogram    Patient Name:	CASEY WAGGONER    :	1932  MRN:	[id number]    Study Start Date/Time:  2024, 4:39:43 PM  Study End Date/Time: in progress    Referred by: Dr. Goncalves    Brief Clinical History:  CASEY WAGGONER is a 89 year old Female a/w presumed toxic metabolic encephalopathy; study performed to investigate for seizures or markers of epilepsy.    Diagnosis Code:   R56.9 convulsions/seizure  The live video was: unmonitored.    Pertinent Medications:  n/a    Acquisition Details:  Electroencephalography was acquired using a minimum of 21 channels on an [employer] system v 9.3.1 with electrode placement according to the standard International 10-20 system following ACNS (American Clinical Neurophysiology Society) guidelines for Long-Term Video EEG monitoring.  Anterior temporal T1 and T2 electrodes were utilized whenever possible.   The [employer] automated spike & seizure detections were all reviewed in detail, in addition to extensive portions of raw EEG.      Daily Updates (from 07:00 am until 07:00 am):  Day 2  -2024  Background:  continuous, with predominantly theta >alpha frequencies.  Symmetry:  Occasional (1-9%) right frontotemporal theta-delta slowing.  Posterior Dominant Rhythm:  7-8 Hz, symmetric, well-organized, and well-modulated.  Organization: Appropriate anterior-posterior gradient.  Voltage:  Normal (20+ uV)  Variability: Yes. 		Reactivity: Yes.  N2 sleep: Symmetric, synchronous spindles and K complexes.  Spontaneous Activity:  No epileptiform discharges.  Periodic/rhythmic activity:  None  Events:  No electrographic seizures or significant clinical events.  Provocations:  Hyperventilation and Photic stimulation: was not performed.    Daily Summary:    Improving mild generalized background slowing indicative of nonspecific diffuse cerebral dysfunction.     There was also superimposed focal slowing indicative of cerebral dysfunction in the right frontotemporal area.    No epileptiform activity and no significant clinical events occurred.    Acacia Felton DO  Attending Neurologist, [facility]

## 2024-05-08 NOTE — PROGRESS NOTE ADULT - PROBLEM SELECTOR PLAN 6
On admission, presented with /64. Repeat 120/60. On home HCTZ-Losartan 100mg-12.5mg, Atenolol 50mg, and Lasix 40mg PO   plan  - c/w home HCTZ-Losartan 100mg-12.5mg and Atenolol 50mg   - Hold Lasix 40mg PO for now as patient is currently euvolemic, restart as clinically indicated

## 2024-05-08 NOTE — PROGRESS NOTE ADULT - SUBJECTIVE AND OBJECTIVE BOX
OVERNIGHT EVENTS: NAEON    SUBJECTIVE / INTERVAL HPI: Patient seen and examined at bedside.     VITAL SIGNS:  Vital Signs Last 24 Hrs  T(C): 36.7 (08 May 2024 06:13), Max: 36.9 (07 May 2024 21:35)  T(F): 98 (08 May 2024 06:13), Max: 98.4 (07 May 2024 21:35)  HR: 77 (08 May 2024 06:13) (73 - 97)  BP: 131/80 (08 May 2024 06:13) (131/80 - 166/64)  BP(mean): --  RR: 18 (08 May 2024 06:13) (18 - 18)  SpO2: 94% (08 May 2024 06:13) (94% - 94%)    Parameters below as of 08 May 2024 06:13  Patient On (Oxygen Delivery Method): room air      I&O's Summary    07 May 2024 07:01  -  08 May 2024 06:54  --------------------------------------------------------  IN: 0 mL / OUT: 1250 mL / NET: -1250 mL        PHYSICAL EXAM:      General: WDWN  HEENT: NC/AT; PERRL, anicteric sclera; MMM  Neck: supple  Cardiovascular: +S1/S2; RRR  Respiratory: CTA B/L; no W/R/R  Gastrointestinal: soft, NT/ND; +BSx4  Extremities: WWP; no edema, clubbing or cyanosis  Vascular: 2+ radial, DP/PT pulses B/L  Neurological: AAOx3; no focal deficits    MEDICATIONS:  MEDICATIONS  (STANDING):  dextrose 10% Bolus 125 milliLiter(s) IV Bolus once  dextrose 5%. 1000 milliLiter(s) (50 mL/Hr) IV Continuous <Continuous>  dextrose 5%. 1000 milliLiter(s) (100 mL/Hr) IV Continuous <Continuous>  dextrose 50% Injectable 12.5 Gram(s) IV Push once  dextrose 50% Injectable 25 Gram(s) IV Push once  folic acid 1 milliGRAM(s) Oral daily  glucagon  Injectable 1 milliGRAM(s) IntraMuscular once  heparin   Injectable 5000 Unit(s) SubCutaneous every 8 hours  insulin lispro (ADMELOG) corrective regimen sliding scale   SubCutaneous three times a day before meals  insulin lispro (ADMELOG) corrective regimen sliding scale   SubCutaneous at bedtime  lactated ringers. 1000 milliLiter(s) (100 mL/Hr) IV Continuous <Continuous>  levothyroxine 100 MICROGram(s) Oral daily  multivitamin 1 Tablet(s) Oral daily  thiamine IVPB 500 milliGRAM(s) IV Intermittent every 8 hours    MEDICATIONS  (PRN):  dextrose Oral Gel 15 Gram(s) Oral once PRN Blood Glucose LESS THAN 70 milliGRAM(s)/deciliter      ALLERGIES:  Allergies    Allergy Status Unknown    Intolerances        LABS:                        12.5   9.33  )-----------( 160      ( 07 May 2024 05:30 )             38.5     05-08    140  |  110<H>  |  24<H>  ----------------------------<  88  4.0   |  22  |  1.38<H>    Ca    8.7      08 May 2024 05:30  Phos  3.2     05-08  Mg     2.1     05-08    TPro  6.0  /  Alb  3.5  /  TBili  0.7  /  DBili  x   /  AST  38  /  ALT  15  /  AlkPhos  80  05-07    PT/INR - ( 07 May 2024 05:30 )   PT: 10.3 sec;   INR: 0.90          PTT - ( 07 May 2024 05:30 )  PTT:29.0 sec  Urinalysis Basic - ( 08 May 2024 05:30 )    Color: x / Appearance: x / SG: x / pH: x  Gluc: 88 mg/dL / Ketone: x  / Bili: x / Urobili: x   Blood: x / Protein: x / Nitrite: x   Leuk Esterase: x / RBC: x / WBC x   Sq Epi: x / Non Sq Epi: x / Bacteria: x      CAPILLARY BLOOD GLUCOSE      POCT Blood Glucose.: 142 mg/dL (07 May 2024 22:07)      RADIOLOGY & ADDITIONAL TESTS: Reviewed.   OVERNIGHT EVENTS: NAEON    SUBJECTIVE / INTERVAL HPI: Patient seen and examined at bedside, doing well, states that she would like to leave, no signs of ams per conversation. Patient denies chest pain, sob, n/v/d.    VITAL SIGNS:  Vital Signs Last 24 Hrs  T(C): 36.7 (08 May 2024 06:13), Max: 36.9 (07 May 2024 21:35)  T(F): 98 (08 May 2024 06:13), Max: 98.4 (07 May 2024 21:35)  HR: 77 (08 May 2024 06:13) (73 - 97)  BP: 131/80 (08 May 2024 06:13) (131/80 - 166/64)  BP(mean): --  RR: 18 (08 May 2024 06:13) (18 - 18)  SpO2: 94% (08 May 2024 06:13) (94% - 94%)    Parameters below as of 08 May 2024 06:13  Patient On (Oxygen Delivery Method): room air      I&O's Summary    07 May 2024 07:01  -  08 May 2024 06:54  --------------------------------------------------------  IN: 0 mL / OUT: 1250 mL / NET: -1250 mL        PHYSICAL EXAM:      General: WDWN  HEENT: NC/AT; PERRL, anicteric sclera; MMM  Neck: supple  Cardiovascular: +S1/S2; RRR  Respiratory: CTA B/L; no W/R/R  Gastrointestinal: soft, NT/ND; +BSx4  Extremities: WWP; no edema, clubbing or cyanosis  Vascular: 2+ radial, DP/PT pulses B/L  Neurological: AAOx3; no focal deficits    MEDICATIONS:  MEDICATIONS  (STANDING):  dextrose 10% Bolus 125 milliLiter(s) IV Bolus once  dextrose 5%. 1000 milliLiter(s) (50 mL/Hr) IV Continuous <Continuous>  dextrose 5%. 1000 milliLiter(s) (100 mL/Hr) IV Continuous <Continuous>  dextrose 50% Injectable 12.5 Gram(s) IV Push once  dextrose 50% Injectable 25 Gram(s) IV Push once  folic acid 1 milliGRAM(s) Oral daily  glucagon  Injectable 1 milliGRAM(s) IntraMuscular once  heparin   Injectable 5000 Unit(s) SubCutaneous every 8 hours  insulin lispro (ADMELOG) corrective regimen sliding scale   SubCutaneous three times a day before meals  insulin lispro (ADMELOG) corrective regimen sliding scale   SubCutaneous at bedtime  lactated ringers. 1000 milliLiter(s) (100 mL/Hr) IV Continuous <Continuous>  levothyroxine 100 MICROGram(s) Oral daily  multivitamin 1 Tablet(s) Oral daily  thiamine IVPB 500 milliGRAM(s) IV Intermittent every 8 hours    MEDICATIONS  (PRN):  dextrose Oral Gel 15 Gram(s) Oral once PRN Blood Glucose LESS THAN 70 milliGRAM(s)/deciliter      ALLERGIES:  Allergies    Allergy Status Unknown    Intolerances        LABS:                        12.5   9.33  )-----------( 160      ( 07 May 2024 05:30 )             38.5     05-08    140  |  110<H>  |  24<H>  ----------------------------<  88  4.0   |  22  |  1.38<H>    Ca    8.7      08 May 2024 05:30  Phos  3.2     05-08  Mg     2.1     05-08    TPro  6.0  /  Alb  3.5  /  TBili  0.7  /  DBili  x   /  AST  38  /  ALT  15  /  AlkPhos  80  05-07    PT/INR - ( 07 May 2024 05:30 )   PT: 10.3 sec;   INR: 0.90          PTT - ( 07 May 2024 05:30 )  PTT:29.0 sec  Urinalysis Basic - ( 08 May 2024 05:30 )    Color: x / Appearance: x / SG: x / pH: x  Gluc: 88 mg/dL / Ketone: x  / Bili: x / Urobili: x   Blood: x / Protein: x / Nitrite: x   Leuk Esterase: x / RBC: x / WBC x   Sq Epi: x / Non Sq Epi: x / Bacteria: x      CAPILLARY BLOOD GLUCOSE      POCT Blood Glucose.: 142 mg/dL (07 May 2024 22:07)      RADIOLOGY & ADDITIONAL TESTS: Reviewed.

## 2024-05-08 NOTE — PROGRESS NOTE ADULT - PROBLEM SELECTOR PLAN 4
Patient with recent increase in ETOH consumption per POA. Home frequently seen to have empty bottles of ETOH in her garbage during routine check-ins. Furthermore, she may be masking amount of ETOH consumption from assistant personel who routinely checks on her. No known history of DTs or seizures.   - CIWA cannot be performed due to patient's current mental status.  - Last drink: unknown  plan  - c/w CIWA protocol, ativan 2mg PRN for CIWA > 8 p  - give thiamine 275x7udf for 3 days, then 250mg daily  - multivitamin and folic acid daily when able to tolerate PO  - monitor EKG with QT prolonging meds  - fall precautions

## 2024-05-08 NOTE — PROGRESS NOTE ADULT - PROBLEM SELECTOR PLAN 3
Patient presents with elevated troponin most likely i/s/o of type 2 demand ischemia  - Trop plateau then downtrended  - Patient denies chest pain on exam, EKG: shows possible type 1 block, no signs of ischemia  plan  resolved

## 2024-05-08 NOTE — PROGRESS NOTE ADULT - ATTENDING COMMENTS
89F w/ PMHx of Dementia c/b frequent falls, hypothyroidism, HTN, HLD was BIBEMS after being found down, admitted for metabolic encephalopathy.    Labs and imaging reviewed    Problem List  #Metabolic encephalopathy due to Wernicke's - improved with thiamine   #Dementia  #Frequent Falls  #HTN  #HLD    Plan  -Continue Thiamine  -Discussion with Neuro regarding whether presentation is consistent with NPH, patient not reliable on history - as per Neuro patient not likely to have NPH, will defer further work up  -PT/OT Eval for CARLOS EDUARDO    Toxic Encephalopathy ruled out, not present on admission
89F w/ PMHx of Dementia c/b frequent falls, hypothyroidism, HTN, HLD was BIBEMS after being found down, admitted for metabolic encephalopathy.    Labs and imaging reviewed    Problem List  #Metabolic encephalopathy due to Wernicke's - improved with thiamine  #Poss NPH   #Dementia  #Frequent Falls  #HTN  #HLD    Plan  -Continue Thiamine  -Discussion with Neuro regarding whether presentation is consistent with NPH, patient not reliable on history  -PT/OT Eval    Toxic Encephalopathy ruled out, not present on admission

## 2024-05-08 NOTE — PROGRESS NOTE ADULT - SUBJECTIVE AND OBJECTIVE BOX
Physical Medicine and Rehabilitation Progress Note :       Patient is a 89y old  Female who presents with a chief complaint of TME, fall (08 May 2024 06:54)      HPI:  89F w/ PMHx of Dementia c/b frequent falls, hypothyroidism, HTN, HLD was BIBEMS after being found down, admitted for toxic metabolic encephalopathy. Collateral obtained from her POA (Armaan Machuca Jr , Esq [phone]) and PCP Dr. Perry ([phone]). Patient had a telephonic visit this past weekend with her PCP and at that time, she was at her baseline (AAOx2-3 and functional). She subsequently had a fall at sometime on 05/05 and was found down by her assistant this morning, which prompted the ED visit. Per POA, patient has recently become more confused and often repeats the same stories for the past few weeks. Of note, the assistant has noticed multiple empty wine bottles in the garbage. Per POA, she ordered 12 bottles of wine x2 in the past 2 weeks. No known history of withdrawal, DT, or seizures. ROS limited by clinical status.    ED Course:  Vitals: 99.9 F, HR 91, /64, RR 19 (96%) on Room air  Labs: WBC 11.27, Neutrophils 96%, Na 134, BUN 21, Glucose 141, Lactate 2.5, , U/A + bacteria, leuk esterase, 2 WBC, protein, ketones,   Imaging: CXR unremarkable. CTH Parenchymal volume loss and chronic microvessel ischemic changes are identified, Dilated lateral and third ventricles are seen with a normal-appearing fourth ventricle. 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 in the correct clinical setting; CT cervical - Degenerative change involving the cervical spine  Consults: None  Interventions: Tylenol, CTX 1g, 2L NS   (06 May 2024 13:00)                            12.0   6.02  )-----------( 125      ( 08 May 2024 05:30 )             37.0       05-08    140  |  110<H>  |  24<H>  ----------------------------<  88  4.0   |  22  |  1.38<H>    Ca    8.7      08 May 2024 05:30  Phos  3.2     05-08  Mg     2.1     05-08    TPro  6.0  /  Alb  3.5  /  TBili  0.7  /  DBili  x   /  AST  38  /  ALT  15  /  AlkPhos  80  05-07    Vital Signs Last 24 Hrs  T(C): 36.7 (08 May 2024 06:13), Max: 36.9 (07 May 2024 21:35)  T(F): 98 (08 May 2024 06:13), Max: 98.4 (07 May 2024 21:35)  HR: 77 (08 May 2024 06:13) (73 - 77)  BP: 131/80 (08 May 2024 06:13) (131/80 - 144/70)  BP(mean): --  RR: 18 (08 May 2024 06:13) (18 - 18)  SpO2: 94% (08 May 2024 06:13) (94% - 94%)    Parameters below as of 08 May 2024 06:13  Patient On (Oxygen Delivery Method): room air        MEDICATIONS  (STANDING):  dextrose 10% Bolus 125 milliLiter(s) IV Bolus once  dextrose 5%. 1000 milliLiter(s) (50 mL/Hr) IV Continuous <Continuous>  dextrose 5%. 1000 milliLiter(s) (100 mL/Hr) IV Continuous <Continuous>  dextrose 50% Injectable 12.5 Gram(s) IV Push once  dextrose 50% Injectable 25 Gram(s) IV Push once  folic acid 1 milliGRAM(s) Oral daily  glucagon  Injectable 1 milliGRAM(s) IntraMuscular once  heparin   Injectable 5000 Unit(s) SubCutaneous every 8 hours  insulin lispro (ADMELOG) corrective regimen sliding scale   SubCutaneous three times a day before meals  insulin lispro (ADMELOG) corrective regimen sliding scale   SubCutaneous at bedtime  lactated ringers. 1000 milliLiter(s) (100 mL/Hr) IV Continuous <Continuous>  levothyroxine 100 MICROGram(s) Oral daily  multivitamin 1 Tablet(s) Oral daily  senna 2 Tablet(s) Oral at bedtime  thiamine IVPB 500 milliGRAM(s) IV Intermittent every 8 hours    MEDICATIONS  (PRN):  dextrose Oral Gel 15 Gram(s) Oral once PRN Blood Glucose LESS THAN 70 milliGRAM(s)/deciliter      T(C): 36.7 (05-08-24 @ 06:13), Max: 36.9 (05-07-24 @ 21:35)  HR: 77 (05-08-24 @ 06:13) (73 - 77)  BP: 131/80 (05-08-24 @ 06:13) (131/80 - 144/70)  RR: 18 (05-08-24 @ 06:13) (18 - 18)  SpO2: 94% (05-08-24 @ 06:13) (94% - 94%)        Physical Exam:        89 y o woman with EEG wrap , lying comfortably in semi Fernández's position , awake , alert , no acute complaints , feels tired     Head: normocephalic , vEEG wrap    Eyes: PERRLA , EOMI , no nystagmus , sclera anicteric    ENT / FACE: neg nasal discharge , uvula midline , no oropharyngeal erythema / exudate    Neck: supple , negative JVD , negative carotid bruits , no thyromegaly    Chest: CTA bilaterally , neg wheeze / rhonchi / rales / crackles / egophany    Cardiovascular: regular rate and rhythm , neg murmurs / rubs / gallops    Abdomen: soft , non distended , no tenderness to palpation in all 4 quadrants ,  normal bowel sounds     Extremities: WWP , neg cyanosis /clubbing / edema     Neurologic Exam:     Alert and oriented to person     Cranial Nerves:           II:                         pupils equal , round and reactive to light , visual fields intact         III/ IV/VI:             extraocular movements intact , neg nystagmus , neg ptosis        V:                        facial sensation intact , V1-3 normal        VII:                      face symmetric , no droop , normal eye closure and smile        VIII:                     hearing intact to finger rub bilaterally        IX and X:             no hoarseness , gag intact , palate/ uvula rise symmetrically        XI:                       SCM / trapezius strength intact bilateral        XII:                      no tongue deviation    Motor Exam:        > 3+/5 x 4 extremities , without drift     Sensation:         intact to light touch x 4 extremities                            no neglect or extinction on double simultaneous testing    DTR:           biceps/brachioradialis: equal                            patella/ankle: equal          neg Babinski       Initial Functional Status Assessment :       Previous Level of Function:     · Additional Comments	Pt slightly confused during conversation -  PT unclear of level of accuracy. Pt currently resides in apartment - has HHA 1-2x/week to assist as needed. Pt stated performing dressing tasks independently. Experienced only 1 fall within past 6 months. Pt stated using SC in home and RW for outdoor ambulation.    Cognitive Status Examination:   · Orientation	person; place; time; Pt unaware of name of hospital - reoriented.  · Level of Consciousness	alert; slightly confused during conversation.  · Follows Commands and Answers Questions	able to follow single-step instructions; 75% of the time  · Personal Safety and Judgment	at risk behaviors demonstrated    Range of Motion Exam:   · Active Range of Motion Examination	no Active ROM deficits were identified; except BUE shoulder flex ~90 deg.    Manual Muscle Testing:   · Manual Muscle Testing Results	Grossly 4/5 throughout BLE in all pivotal joints.    Bed Mobility: Rolling/Turning:     · Level of Fleming	minimum assist (75% patients effort)  · Physical Assist/Nonphysical Assist	1 person assist    Bed Mobility: Scooting/Bridging:     · Level of Fleming	minimum assist (75% patients effort)  · Physical Assist/Nonphysical Assist	1 person assist    Bed Mobility: Sit to Supine:     · Level of Fleming	minimum assist (75% patients effort)  · Physical Assist/Nonphysical Assist	1 person assist    Bed Mobility: Supine to Sit:     · Level of Fleming	minimum assist (75% patients effort)  · Physical Assist/Nonphysical Assist	1 person assist    Bed Mobility Analysis:     · Bed Mobility Limitations	decreased ability to use legs for bridging/pushing; impaired ability to control trunk for mobility; Slightly retropulsive sitting at EOB - required assist at times to maintain erect sitting balance.  · Impairments Contributing to Impaired Bed Mobility	impaired balance; impaired postural control; decreased strength    Transfer: Sit to Stand:     · Level of Fleming	moderate assist (50% patients effort)  · Physical Assist/Nonphysical Assist	2 person assist  · Assistive Device	bilat hand held assist    Transfer: Stand to Sit:     · Level of Fleming	moderate assist (50% patients effort)  · Physical Assist/Nonphysical Assist	2 person assist  · Assistive Device	bilat hand held assist    Sit/Stand Transfer Safety Analysis:     · Transfer Safety Concerns Noted	decreased weight-shifting ability  · Impairments Contributing to Impaired Transfers	decreased strength; impaired postural control; impaired balance    Gait Skills:     · Level of Fleming	moderate assist (50% patients effort)  · Physical Assist/Nonphysical Assist	2 person assist  · Assistive Device	bilat hand held assist  · Gait Distance	3 side steps at EOB    Gait Analysis:     · Gait Pattern Used	3-point gait  · Gait Deviations Noted	decreased rimma; decreased velocity of limb motion; decreased step length; decreased weight-shifting ability; Pt w/ dec weight shifting abilities/slightly unsteady gait however no LOB/falls or knee buckling observed. Dec step length and rimma noted. Pt w/ tendency to shuffle steps instead of performing hip and knee flex. No SOB observed.  · Impairments Contributing to Gait Deviations	impaired balance; narrow base of support; impaired postural control; decreased strength    Balance Skills Assessment:     · Sitting Balance: Static	fair plus  · Sitting Balance: Dynamic	fair balance  · Sit-to-Stand Balance	fair balance  · Standing Balance: Static	fair balance  · Standing Balance: Dynamic	fair balance  · Systems Impairment Contributing to Balance Disturbance	musculoskeletal  · Identified Impairments Contributing to Balance Disturbance	decreased strength; impaired postural control    Sensory Examination:   Sensory Examination:    Grossly Intact:   · Gross Sensory Examination	Grossly intact to light touch sensation throughout BLE and BUE.      Clinical Impressions:   · Criteria for Skilled Therapeutic Interventions	impairments found; functional limitations in following categories; rehab potential; therapy frequency; anticipated discharge recommendation  · Impairments Found (describe specific impairments)	muscle strength; posture; ROM; gross motor; gait, locomotion, and balance; cognitive impairment  · Functional Limitations in Following Categories (describe specific limitations)	self-care; home management; work; community/leisure      Visual Assessment:   · Eye Muscle Balance	normal  · Visual Scanning	WFL    Fine Motor Coordination:     Fine Motor Coordination:   · Left Hand, Manipulation of Objects	normal performance  · Right Hand, Manipulation of Objects	normal performance    Lower Body Dressing Training:     · Level of Fleming	maximum assist (25% patients effort); adjust socks at EOB  · Physical Assist/Nonphysical Assist	verbal cues; nonverbal cues (demo/gestures); 1 person assist    Eating/Self-Feeding Training:     · Level of Fleming	independent; bring food to mouth            PM&R Impression : as above    Current disposition plan recommendation :    subacute rehab placement

## 2024-05-08 NOTE — PROGRESS NOTE ADULT - PROBLEM SELECTOR PLAN 5
Patient with history of multiple falls in the past few months. Patient was found down after a suspected fall this weekend. Last known well on Saturday.  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 CT cervical - degenerative changes. Likely 2/2 progressive dementia vs. ETOH use. vs. NPH, orthostatic negative  plan  - PT: CARLOS EDUARDO  - Pain management: Tylenol 650mg q6 IV PRN, lidocaine patch  - Weight bearing/Fall precautions

## 2024-05-08 NOTE — PROGRESS NOTE ADULT - PROBLEM SELECTOR PLAN 2
- patient presents with Cr 1.04, this Am Cr selina to 1.37 s/p 2 L, UA negative for cast concerning, however did not respond to fliuds, and has elevated CPK levels that is now downtrending concerning for ATN vs pre-renal  plan  - will start  cc/hr  - will order urine studies  - renally dose medications  - Avoid nephrotoxic agents - patient presents with Cr 1.04, this Am Cr selina to 1.37 s/p 2 L, UA negative for cast concerning, however did not respond to fliuds, and has elevated CPK levels that is now downtrending concerning for ATN vs pre-renal, urine studies pre-renal  plan  - will c/w  cc/hr  - renally dose medications  - Avoid nephrotoxic agents

## 2024-05-08 NOTE — PROGRESS NOTE ADULT - PROBLEM SELECTOR PLAN 1
Etiology most likely in setting of wernickes vs less likely toxic metabolic   Presents after being found down over the weekend, found this morning. On admission, did not meet SIRS criteria. U/A + bacteria, leuk esterase, 2 WBC, protein, ketones. . S/p CTX and 2L NS. Lactate 2.5. Likely 2/2 ETOH abuse vs. progressive dementia vs. NPH vs. metabolic derangements. 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 CT cervical - degenerative changes  - EEG showing triphasic waves associated with toxic metabolic derangement which improved/ resolved by the end of the recording, as well as asymmetric slowing over the right frontotemporal area indicative of non-specific focal cerebral dysfunction.  plan  - Pending recs from neuro  - Monitor off Abx, repeat UA clear  - F/u BCx and UCx  - will hold MRI w/w/o contrast Etiology most likely in setting of wernickes vs less likely toxic metabolic   Presents after being found down over the weekend, found this morning. On admission, did not meet SIRS criteria. U/A + bacteria, leuk esterase, 2 WBC, protein, ketones. . S/p CTX and 2L NS. Lactate 2.5. Likely 2/2 ETOH abuse vs. progressive dementia vs. NPH vs. metabolic derangements. 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 CT cervical - degenerative changes  - EEG showing triphasic waves associated with toxic metabolic derangement which improved/ resolved by the end of the recording, as well as asymmetric slowing over the right frontotemporal area indicative of non-specific focal cerebral dysfunction.  plan  resolved   - per Neuro: MRI outpatient  - Monitor off Abx, repeat UA clear

## 2024-05-09 VITALS — SYSTOLIC BLOOD PRESSURE: 167 MMHG | DIASTOLIC BLOOD PRESSURE: 91 MMHG | HEART RATE: 77 BPM

## 2024-05-09 LAB
ANION GAP SERPL CALC-SCNC: 10 MMOL/L — SIGNIFICANT CHANGE UP (ref 5–17)
BASOPHILS # BLD AUTO: 0.02 K/UL — SIGNIFICANT CHANGE UP (ref 0–0.2)
BASOPHILS NFR BLD AUTO: 0.2 % — SIGNIFICANT CHANGE UP (ref 0–2)
BUN SERPL-MCNC: 17 MG/DL — SIGNIFICANT CHANGE UP (ref 7–23)
CALCIUM SERPL-MCNC: 9.4 MG/DL — SIGNIFICANT CHANGE UP (ref 8.4–10.5)
CHLORIDE SERPL-SCNC: 107 MMOL/L — SIGNIFICANT CHANGE UP (ref 96–108)
CO2 SERPL-SCNC: 26 MMOL/L — SIGNIFICANT CHANGE UP (ref 22–31)
CREAT SERPL-MCNC: 1.41 MG/DL — HIGH (ref 0.5–1.3)
EGFR: 35 ML/MIN/1.73M2 — LOW
EOSINOPHIL # BLD AUTO: 0.01 K/UL — SIGNIFICANT CHANGE UP (ref 0–0.5)
EOSINOPHIL NFR BLD AUTO: 0.1 % — SIGNIFICANT CHANGE UP (ref 0–6)
GLUCOSE BLDC GLUCOMTR-MCNC: 105 MG/DL — HIGH (ref 70–99)
GLUCOSE BLDC GLUCOMTR-MCNC: 118 MG/DL — HIGH (ref 70–99)
GLUCOSE SERPL-MCNC: 109 MG/DL — HIGH (ref 70–99)
HCT VFR BLD CALC: 39.5 % — SIGNIFICANT CHANGE UP (ref 34.5–45)
HGB BLD-MCNC: 12.9 G/DL — SIGNIFICANT CHANGE UP (ref 11.5–15.5)
IMM GRANULOCYTES NFR BLD AUTO: 0.6 % — SIGNIFICANT CHANGE UP (ref 0–0.9)
LYMPHOCYTES # BLD AUTO: 0.83 K/UL — LOW (ref 1–3.3)
LYMPHOCYTES # BLD AUTO: 9.7 % — LOW (ref 13–44)
MAGNESIUM SERPL-MCNC: 1.8 MG/DL — SIGNIFICANT CHANGE UP (ref 1.6–2.6)
MCHC RBC-ENTMCNC: 30.6 PG — SIGNIFICANT CHANGE UP (ref 27–34)
MCHC RBC-ENTMCNC: 32.7 GM/DL — SIGNIFICANT CHANGE UP (ref 32–36)
MCV RBC AUTO: 93.6 FL — SIGNIFICANT CHANGE UP (ref 80–100)
MONOCYTES # BLD AUTO: 0.84 K/UL — SIGNIFICANT CHANGE UP (ref 0–0.9)
MONOCYTES NFR BLD AUTO: 9.8 % — SIGNIFICANT CHANGE UP (ref 2–14)
NEUTROPHILS # BLD AUTO: 6.82 K/UL — SIGNIFICANT CHANGE UP (ref 1.8–7.4)
NEUTROPHILS NFR BLD AUTO: 79.6 % — HIGH (ref 43–77)
NRBC # BLD: 0 /100 WBCS — SIGNIFICANT CHANGE UP (ref 0–0)
PHOSPHATE SERPL-MCNC: 3.2 MG/DL — SIGNIFICANT CHANGE UP (ref 2.5–4.5)
PLATELET # BLD AUTO: 155 K/UL — SIGNIFICANT CHANGE UP (ref 150–400)
POTASSIUM SERPL-MCNC: 4.6 MMOL/L — SIGNIFICANT CHANGE UP (ref 3.5–5.3)
POTASSIUM SERPL-SCNC: 4.6 MMOL/L — SIGNIFICANT CHANGE UP (ref 3.5–5.3)
RBC # BLD: 4.22 M/UL — SIGNIFICANT CHANGE UP (ref 3.8–5.2)
RBC # FLD: 12.8 % — SIGNIFICANT CHANGE UP (ref 10.3–14.5)
SODIUM SERPL-SCNC: 143 MMOL/L — SIGNIFICANT CHANGE UP (ref 135–145)
WBC # BLD: 8.57 K/UL — SIGNIFICANT CHANGE UP (ref 3.8–10.5)
WBC # FLD AUTO: 8.57 K/UL — SIGNIFICANT CHANGE UP (ref 3.8–10.5)

## 2024-05-09 PROCEDURE — 95700 EEG CONT REC W/VID EEG TECH: CPT

## 2024-05-09 PROCEDURE — 97116 GAIT TRAINING THERAPY: CPT

## 2024-05-09 PROCEDURE — 86850 RBC ANTIBODY SCREEN: CPT

## 2024-05-09 PROCEDURE — 99285 EMERGENCY DEPT VISIT HI MDM: CPT | Mod: 25

## 2024-05-09 PROCEDURE — 85025 COMPLETE CBC W/AUTO DIFF WBC: CPT

## 2024-05-09 PROCEDURE — 80048 BASIC METABOLIC PNL TOTAL CA: CPT

## 2024-05-09 PROCEDURE — 84100 ASSAY OF PHOSPHORUS: CPT

## 2024-05-09 PROCEDURE — 84145 PROCALCITONIN (PCT): CPT

## 2024-05-09 PROCEDURE — 71045 X-RAY EXAM CHEST 1 VIEW: CPT

## 2024-05-09 PROCEDURE — 80307 DRUG TEST PRSMV CHEM ANLYZR: CPT

## 2024-05-09 PROCEDURE — 86901 BLOOD TYPING SEROLOGIC RH(D): CPT

## 2024-05-09 PROCEDURE — 87040 BLOOD CULTURE FOR BACTERIA: CPT

## 2024-05-09 PROCEDURE — 96375 TX/PRO/DX INJ NEW DRUG ADDON: CPT

## 2024-05-09 PROCEDURE — 70450 CT HEAD/BRAIN W/O DYE: CPT | Mod: MC

## 2024-05-09 PROCEDURE — 85610 PROTHROMBIN TIME: CPT

## 2024-05-09 PROCEDURE — 96374 THER/PROPH/DIAG INJ IV PUSH: CPT

## 2024-05-09 PROCEDURE — 82553 CREATINE MB FRACTION: CPT

## 2024-05-09 PROCEDURE — 93005 ELECTROCARDIOGRAM TRACING: CPT

## 2024-05-09 PROCEDURE — 83605 ASSAY OF LACTIC ACID: CPT

## 2024-05-09 PROCEDURE — 84132 ASSAY OF SERUM POTASSIUM: CPT

## 2024-05-09 PROCEDURE — 84300 ASSAY OF URINE SODIUM: CPT

## 2024-05-09 PROCEDURE — 97165 OT EVAL LOW COMPLEX 30 MIN: CPT

## 2024-05-09 PROCEDURE — 85730 THROMBOPLASTIN TIME PARTIAL: CPT

## 2024-05-09 PROCEDURE — 36415 COLL VENOUS BLD VENIPUNCTURE: CPT

## 2024-05-09 PROCEDURE — 86900 BLOOD TYPING SEROLOGIC ABO: CPT

## 2024-05-09 PROCEDURE — 84540 ASSAY OF URINE/UREA-N: CPT

## 2024-05-09 PROCEDURE — 72125 CT NECK SPINE W/O DYE: CPT | Mod: MC

## 2024-05-09 PROCEDURE — 97530 THERAPEUTIC ACTIVITIES: CPT

## 2024-05-09 PROCEDURE — 84484 ASSAY OF TROPONIN QUANT: CPT

## 2024-05-09 PROCEDURE — 80053 COMPREHEN METABOLIC PANEL: CPT

## 2024-05-09 PROCEDURE — 99239 HOSP IP/OBS DSCHRG MGMT >30: CPT

## 2024-05-09 PROCEDURE — 84443 ASSAY THYROID STIM HORMONE: CPT

## 2024-05-09 PROCEDURE — 82550 ASSAY OF CK (CPK): CPT

## 2024-05-09 PROCEDURE — 83735 ASSAY OF MAGNESIUM: CPT

## 2024-05-09 PROCEDURE — 81001 URINALYSIS AUTO W/SCOPE: CPT

## 2024-05-09 PROCEDURE — 95708 EEG WO VID EA 12-26HR UNMNTR: CPT

## 2024-05-09 PROCEDURE — 82570 ASSAY OF URINE CREATININE: CPT

## 2024-05-09 PROCEDURE — 97161 PT EVAL LOW COMPLEX 20 MIN: CPT

## 2024-05-09 PROCEDURE — 82962 GLUCOSE BLOOD TEST: CPT

## 2024-05-09 RX ORDER — SENNA PLUS 8.6 MG/1
2 TABLET ORAL
Qty: 0 | Refills: 0 | DISCHARGE
Start: 2024-05-09

## 2024-05-09 RX ORDER — FOLIC ACID 0.8 MG
1 TABLET ORAL
Qty: 0 | Refills: 0 | DISCHARGE
Start: 2024-05-09

## 2024-05-09 RX ORDER — ATORVASTATIN CALCIUM 80 MG/1
40 TABLET, FILM COATED ORAL AT BEDTIME
Refills: 0 | Status: DISCONTINUED | OUTPATIENT
Start: 2024-05-09 | End: 2024-05-09

## 2024-05-09 RX ORDER — FUROSEMIDE 40 MG
1 TABLET ORAL
Refills: 0 | DISCHARGE

## 2024-05-09 RX ORDER — FOLIC ACID 0.8 MG
1 TABLET ORAL
Qty: 90 | Refills: 0
Start: 2024-05-09 | End: 2024-08-06

## 2024-05-09 RX ORDER — ATENOLOL 25 MG/1
50 TABLET ORAL DAILY
Refills: 0 | Status: DISCONTINUED | OUTPATIENT
Start: 2024-05-09 | End: 2024-05-09

## 2024-05-09 RX ORDER — LOSARTAN POTASSIUM 100 MG/1
12.5 TABLET, FILM COATED ORAL EVERY 24 HOURS
Refills: 0 | Status: DISCONTINUED | OUTPATIENT
Start: 2024-05-09 | End: 2024-05-09

## 2024-05-09 RX ORDER — FUROSEMIDE 40 MG
40 TABLET ORAL EVERY 24 HOURS
Refills: 0 | Status: DISCONTINUED | OUTPATIENT
Start: 2024-05-10 | End: 2024-05-09

## 2024-05-09 RX ORDER — MAGNESIUM SULFATE 500 MG/ML
2 VIAL (ML) INJECTION ONCE
Refills: 0 | Status: COMPLETED | OUTPATIENT
Start: 2024-05-09 | End: 2024-05-09

## 2024-05-09 RX ADMIN — SODIUM CHLORIDE 100 MILLILITER(S): 9 INJECTION, SOLUTION INTRAVENOUS at 06:59

## 2024-05-09 RX ADMIN — LOSARTAN POTASSIUM 12.5 MILLIGRAM(S): 100 TABLET, FILM COATED ORAL at 16:39

## 2024-05-09 RX ADMIN — Medication 25 GRAM(S): at 08:57

## 2024-05-09 RX ADMIN — Medication 250 MILLIGRAM(S): at 13:36

## 2024-05-09 RX ADMIN — Medication 100 MICROGRAM(S): at 07:00

## 2024-05-09 RX ADMIN — Medication 1 MILLIGRAM(S): at 13:34

## 2024-05-09 RX ADMIN — ATENOLOL 50 MILLIGRAM(S): 25 TABLET ORAL at 07:45

## 2024-05-09 RX ADMIN — HEPARIN SODIUM 5000 UNIT(S): 5000 INJECTION INTRAVENOUS; SUBCUTANEOUS at 07:00

## 2024-05-09 RX ADMIN — HEPARIN SODIUM 5000 UNIT(S): 5000 INJECTION INTRAVENOUS; SUBCUTANEOUS at 15:00

## 2024-05-09 RX ADMIN — Medication 1 TABLET(S): at 13:35

## 2024-05-09 NOTE — PROGRESS NOTE ADULT - PROBLEM SELECTOR PLAN 2
- patient presents with Cr 1.04, this Am Cr selina to 1.37 s/p 2 L, UA negative for cast concerning, however did not respond to fliuds, and has elevated CPK levels that is now downtrending concerning for ATN vs pre-renal, urine studies pre-renal  plan  - will c/w  cc/hr  - renally dose medications  - Avoid nephrotoxic agents

## 2024-05-09 NOTE — DISCHARGE NOTE NURSING/CASE MANAGEMENT/SOCIAL WORK - NSDCPEFALRISK_GEN_ALL_CORE
For information on Fall & Injury Prevention, visit: [email or web address] OR  [email or web address] OR  https://www.cdc.gov/steadi/patient.html

## 2024-05-09 NOTE — EEG REPORT - NS EEG TEXT BOX
[facility] Department of Neurology  Inpatient Continuous video-Electroencephalogram    Patient Name:	CASEY WAGGONER    :	1932  MRN:	[id number]    Study Start Date/Time:  2024, 4:39:43 PM  Study End Date/Time: in progress    Referred by: Dr. Goncalves    Brief Clinical History:  CASEY WAGGONER is a 89 year old Female a/w presumed toxic metabolic encephalopathy; study performed to investigate for seizures or markers of epilepsy.    Diagnosis Code:   R56.9 convulsions/seizure  The live video was: unmonitored.    Pertinent Medications:  n/a    Acquisition Details:  Electroencephalography was acquired using a minimum of 21 channels on an [employer] system v 9.3.1 with electrode placement according to the standard International 10-20 system following ACNS (American Clinical Neurophysiology Society) guidelines for Long-Term Video EEG monitoring.  Anterior temporal T1 and T2 electrodes were utilized whenever possible.   The [employer] automated spike & seizure detections were all reviewed in detail, in addition to extensive portions of raw EEG.      Daily Updates (from 07:00 am until 07:00 am):  Day 2  -2024   Background:  continuous, with predominantly theta >alpha frequencies.  Symmetry:  Occasional (1-9%) left frontotemporal theta-delta slowing.  Posterior Dominant Rhythm:  7-8 Hz, symmetric, well-organized, and well-modulated.  Organization: Appropriate anterior-posterior gradient.  Voltage:  Normal (20+ uV)  Variability: Yes. 		Reactivity: Yes.  N2 sleep: Symmetric, synchronous spindles and K complexes.  Spontaneous Activity:  No epileptiform discharges.  Periodic/rhythmic activity:  None  Events:  No electrographic seizures or significant clinical events.  Provocations:  Hyperventilation and Photic stimulation: was not performed.    Daily Summary:    Improved mild generalized background slowing indicative of nonspecific diffuse cerebral dysfunction.     There was also superimposed focal slowing indicative of cerebral dysfunction in the left frontotemporal area.    No epileptiform activity and no significant clinical events occurred.    Would consider d/c VEEG given absence of epileptiform activity for several days.     Acacia Felton DO  Attending Neurologist, [facility]

## 2024-05-09 NOTE — PROGRESS NOTE ADULT - PROBLEM SELECTOR PLAN 10
F: s/p 2L NS in the ED   E: replete K<4, Mg<2  N: regular  VTE Prophylaxis: Heparin SubQ  C: Full Code  D: KAYLA

## 2024-05-09 NOTE — PROGRESS NOTE ADULT - PROBLEM SELECTOR PLAN 4
Patient with recent increase in ETOH consumption per POA. Home frequently seen to have empty bottles of ETOH in her garbage during routine check-ins. Furthermore, she may be masking amount of ETOH consumption from assistant personel who routinely checks on her. No known history of DTs or seizures.   - CIWA cannot be performed due to patient's current mental status.  - Last drink: unknown  plan  - c/w CIWA protocol, ativan 2mg PRN for CIWA > 8 p  - give thiamine [id number] for 3 days, then 250mg daily  - multivitamin and folic acid daily when able to tolerate PO  - monitor EKG with QT prolonging meds  - fall precautions Patient with recent increase in ETOH consumption per POA. Home frequently seen to have empty bottles of ETOH in her garbage during routine check-ins. Furthermore, she may be masking amount of ETOH consumption from assistant personel who routinely checks on her. No known history of DTs or seizures.   - CIWA cannot be performed due to patient's current mental status.  - Last drink: unknown  plan  - c/w CIWA protocol, ativan 2mg PRN for CIWA > 8 p  - give thiamine [id number] for 3 days, finished 5/8, will start 250mg daily  - multivitamin and folic acid daily when able to tolerate PO  - monitor EKG with QT prolonging meds  - fall precautions

## 2024-05-09 NOTE — PROGRESS NOTE ADULT - PROBLEM SELECTOR PLAN 1
Etiology most likely in setting of wernickes vs less likely toxic metabolic   Presents after being found down over the weekend, found this morning. On admission, did not meet SIRS criteria. U/A + bacteria, leuk esterase, 2 WBC, protein, ketones. . S/p CTX and 2L NS. Lactate 2.5. Likely 2/2 ETOH abuse vs. progressive dementia vs. NPH vs. metabolic derangements. 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 CT cervical - degenerative changes  - EEG showing triphasic waves associated with toxic metabolic derangement which improved/ resolved by the end of the recording, as well as asymmetric slowing over the right frontotemporal area indicative of non-specific focal cerebral dysfunction.  plan  resolved   - per Neuro: MRI outpatient  - Monitor off Abx, repeat UA clear

## 2024-05-09 NOTE — PROGRESS NOTE ADULT - SUBJECTIVE AND OBJECTIVE BOX
OVERNIGHT EVENTS: BP 160s, was agitated when took it. Sundowning, roommate frustrated too. AM BPs 190s, also agitated, restarted home atenolol stat.     SUBJECTIVE / INTERVAL HPI: Patient seen and examined at bedside.     VITAL SIGNS:  Vital Signs Last 24 Hrs  T(C): 36.8 (09 May 2024 06:10), Max: 36.9 (08 May 2024 21:10)  T(F): 98.3 (09 May 2024 06:10), Max: 98.4 (08 May 2024 21:10)  HR: 76 (09 May 2024 06:10) (76 - 81)  BP: 191/79 (09 May 2024 06:10) (137/77 - 191/79)  BP(mean): 106 (08 May 2024 21:10) (106 - 106)  RR: 18 (09 May 2024 06:10) (18 - 18)  SpO2: 96% (09 May 2024 06:10) (93% - 96%)    Parameters below as of 09 May 2024 06:10  Patient On (Oxygen Delivery Method): room air      I&O's Summary    07 May 2024 07:01  -  08 May 2024 07:00  --------------------------------------------------------  IN: 0 mL / OUT: 1250 mL / NET: -1250 mL    08 May 2024 07:01  -  09 May 2024 06:58  --------------------------------------------------------  IN: 900 mL / OUT: 800 mL / NET: 100 mL        PHYSICAL EXAM:    General: WDWN  HEENT: NC/AT; PERRL, anicteric sclera; MMM  Neck: supple  Cardiovascular: +S1/S2; RRR  Respiratory: CTA B/L; no W/R/R  Gastrointestinal: soft, NT/ND; +BSx4  Extremities: WWP; no edema, clubbing or cyanosis  Vascular: 2+ radial, DP/PT pulses B/L  Neurological: AAOx3; no focal deficits    MEDICATIONS:  MEDICATIONS  (STANDING):  atenolol  Tablet 50 milliGRAM(s) Oral daily  dextrose 10% Bolus 125 milliLiter(s) IV Bolus once  dextrose 5%. 1000 milliLiter(s) (50 mL/Hr) IV Continuous <Continuous>  dextrose 5%. 1000 milliLiter(s) (100 mL/Hr) IV Continuous <Continuous>  dextrose 50% Injectable 25 Gram(s) IV Push once  dextrose 50% Injectable 12.5 Gram(s) IV Push once  folic acid 1 milliGRAM(s) Oral daily  glucagon  Injectable 1 milliGRAM(s) IntraMuscular once  heparin   Injectable 5000 Unit(s) SubCutaneous every 8 hours  insulin lispro (ADMELOG) corrective regimen sliding scale   SubCutaneous three times a day before meals  insulin lispro (ADMELOG) corrective regimen sliding scale   SubCutaneous at bedtime  lactated ringers. 1000 milliLiter(s) (100 mL/Hr) IV Continuous <Continuous>  levothyroxine 100 MICROGram(s) Oral daily  multivitamin 1 Tablet(s) Oral daily  senna 2 Tablet(s) Oral at bedtime  thiamine 250 milliGRAM(s) Oral daily    MEDICATIONS  (PRN):  dextrose Oral Gel 15 Gram(s) Oral once PRN Blood Glucose LESS THAN 70 milliGRAM(s)/deciliter      ALLERGIES:  Allergies    Allergy Status Unknown    Intolerances        LABS:                        12.0   6.02  )-----------( 125      ( 08 May 2024 05:30 )             37.0     05-08    140  |  110<H>  |  24<H>  ----------------------------<  88  4.0   |  22  |  1.38<H>    Ca    8.7      08 May 2024 05:30  Phos  3.2     05-08  Mg     2.1     05-08        Urinalysis Basic - ( 08 May 2024 05:30 )    Color: x / Appearance: x / SG: x / pH: x  Gluc: 88 mg/dL / Ketone: x  / Bili: x / Urobili: x   Blood: x / Protein: x / Nitrite: x   Leuk Esterase: x / RBC: x / WBC x   Sq Epi: x / Non Sq Epi: x / Bacteria: x      CAPILLARY BLOOD GLUCOSE      POCT Blood Glucose.: 98 mg/dL (08 May 2024 22:17)      RADIOLOGY & ADDITIONAL TESTS: Reviewed.   OVERNIGHT EVENTS: BP 160s, was agitated when took it. Sundowning, roommate frustrated too. AM BPs 190s, also agitated, restarted home atenolol stat.     SUBJECTIVE / INTERVAL HPI: Patient seen and examined at bedside, states that she is not in pain and can recall events, seems that patient may be sundowning, however this morning fully alert, understands she is going to [facility].     VITAL SIGNS:  Vital Signs Last 24 Hrs  T(C): 36.8 (09 May 2024 06:10), Max: 36.9 (08 May 2024 21:10)  T(F): 98.3 (09 May 2024 06:10), Max: 98.4 (08 May 2024 21:10)  HR: 76 (09 May 2024 06:10) (76 - 81)  BP: 191/79 (09 May 2024 06:10) (137/77 - 191/79)  BP(mean): 106 (08 May 2024 21:10) (106 - 106)  RR: 18 (09 May 2024 06:10) (18 - 18)  SpO2: 96% (09 May 2024 06:10) (93% - 96%)    Parameters below as of 09 May 2024 06:10  Patient On (Oxygen Delivery Method): room air      I&O's Summary    07 May 2024 07:01  -  08 May 2024 07:00  --------------------------------------------------------  IN: 0 mL / OUT: 1250 mL / NET: -1250 mL    08 May 2024 07:01  -  09 May 2024 06:58  --------------------------------------------------------  IN: 900 mL / OUT: 800 mL / NET: 100 mL      PHYSICAL EXAM:    General: WDWN  HEENT: NC/AT; PERRL, anicteric sclera; MMM  Neck: supple  Cardiovascular: +S1/S2; RRR  Respiratory: CTA B/L; no W/R/R  Gastrointestinal: soft, NT/ND; +BSx4  Extremities: WWP; no edema, clubbing or cyanosis  Vascular: 2+ radial, DP/PT pulses B/L  Neurological: AAOx3; no focal deficits    MEDICATIONS:  MEDICATIONS  (STANDING):  atenolol  Tablet 50 milliGRAM(s) Oral daily  dextrose 10% Bolus 125 milliLiter(s) IV Bolus once  dextrose 5%. 1000 milliLiter(s) (50 mL/Hr) IV Continuous <Continuous>  dextrose 5%. 1000 milliLiter(s) (100 mL/Hr) IV Continuous <Continuous>  dextrose 50% Injectable 25 Gram(s) IV Push once  dextrose 50% Injectable 12.5 Gram(s) IV Push once  folic acid 1 milliGRAM(s) Oral daily  glucagon  Injectable 1 milliGRAM(s) IntraMuscular once  heparin   Injectable 5000 Unit(s) SubCutaneous every 8 hours  insulin lispro (ADMELOG) corrective regimen sliding scale   SubCutaneous three times a day before meals  insulin lispro (ADMELOG) corrective regimen sliding scale   SubCutaneous at bedtime  lactated ringers. 1000 milliLiter(s) (100 mL/Hr) IV Continuous <Continuous>  levothyroxine 100 MICROGram(s) Oral daily  multivitamin 1 Tablet(s) Oral daily  senna 2 Tablet(s) Oral at bedtime  thiamine 250 milliGRAM(s) Oral daily    MEDICATIONS  (PRN):  dextrose Oral Gel 15 Gram(s) Oral once PRN Blood Glucose LESS THAN 70 milliGRAM(s)/deciliter      ALLERGIES:  Allergies    Allergy Status Unknown    Intolerances        LABS:                        12.0   6.02  )-----------( 125      ( 08 May 2024 05:30 )             37.0     05-08    140  |  110<H>  |  24<H>  ----------------------------<  88  4.0   |  22  |  1.38<H>    Ca    8.7      08 May 2024 05:30  Phos  3.2     05-08  Mg     2.1     05-08        Urinalysis Basic - ( 08 May 2024 05:30 )    Color: x / Appearance: x / SG: x / pH: x  Gluc: 88 mg/dL / Ketone: x  / Bili: x / Urobili: x   Blood: x / Protein: x / Nitrite: x   Leuk Esterase: x / RBC: x / WBC x   Sq Epi: x / Non Sq Epi: x / Bacteria: x      CAPILLARY BLOOD GLUCOSE      POCT Blood Glucose.: 98 mg/dL (08 May 2024 22:17)      RADIOLOGY & ADDITIONAL TESTS: Reviewed.

## 2024-05-09 NOTE — DISCHARGE NOTE NURSING/CASE MANAGEMENT/SOCIAL WORK - NSDPFAC_GEN_ALL_CORE
[facility] [place] [phone] ([phone])

## 2024-05-09 NOTE — DISCHARGE NOTE NURSING/CASE MANAGEMENT/SOCIAL WORK - PATIENT PORTAL LINK FT
You can access the FollowMyHealth Patient Portal offered by [facility] by registering at the following website: [email or web address]. By joining [facility] FollowMyHealth portal, you will also be able to view your health information using other applications (apps) compatible with our system.

## 2024-05-09 NOTE — PROGRESS NOTE ADULT - ASSESSMENT
89F w/ PMHx of Dementia c/b frequent falls, hypothyroidism, HTN, HLD was BIBEMS after being found down, admitted for toxic metabolic encephalopathy. 89F w/ PMHx of Dementia c/b frequent falls, hypothyroidism, HTN, HLD was BIBEMS after being found down, admitted for metabolic encephalopathy i/s/o wernickes, pt/ot: CARLOS EDUARDO, pending placement.

## 2024-05-09 NOTE — PROGRESS NOTE ADULT - PROBLEM SELECTOR PLAN 7
Known history of dementia. Per POA (Armaan Machuca Jr), patient is conversive and can ambulate, however has slowed movements and takes time to execute plans (going to the bathroom, kitchen, etc). Recently, she has become more forgetful  - SW consult for ZENA

## 2024-05-09 NOTE — PROGRESS NOTE ADULT - PROBLEM SELECTOR PLAN 9
Known history of hypothyroidism. On home Synthroid 100mcg  - C/w Synthroid 100mcg

## 2024-05-11 LAB
CULTURE RESULTS: SIGNIFICANT CHANGE UP
CULTURE RESULTS: SIGNIFICANT CHANGE UP
SPECIMEN SOURCE: SIGNIFICANT CHANGE UP
SPECIMEN SOURCE: SIGNIFICANT CHANGE UP

## 2024-05-16 ENCOUNTER — NON-APPOINTMENT (OUTPATIENT)
Age: 89
End: 2024-05-16

## 2024-05-16 PROBLEM — Z00.00 ENCOUNTER FOR PREVENTIVE HEALTH EXAMINATION: Status: ACTIVE | Noted: 2024-05-16

## 2024-05-17 DIAGNOSIS — E51.2 WERNICKE'S ENCEPHALOPATHY: ICD-10-CM

## 2024-05-17 DIAGNOSIS — E03.9 HYPOTHYROIDISM, UNSPECIFIED: ICD-10-CM

## 2024-05-17 DIAGNOSIS — Z79.890 HORMONE REPLACEMENT THERAPY: ICD-10-CM

## 2024-05-17 DIAGNOSIS — F10.10 ALCOHOL ABUSE, UNCOMPLICATED: ICD-10-CM

## 2024-05-17 DIAGNOSIS — Z79.899 OTHER LONG TERM (CURRENT) DRUG THERAPY: ICD-10-CM

## 2024-05-17 DIAGNOSIS — N17.9 ACUTE KIDNEY FAILURE, UNSPECIFIED: ICD-10-CM

## 2024-05-17 DIAGNOSIS — R29.6 REPEATED FALLS: ICD-10-CM

## 2024-05-17 DIAGNOSIS — E78.5 HYPERLIPIDEMIA, UNSPECIFIED: ICD-10-CM

## 2024-05-17 DIAGNOSIS — F03.911 UNSPECIFIED DEMENTIA, UNSPECIFIED SEVERITY, WITH AGITATION: ICD-10-CM

## 2024-05-17 DIAGNOSIS — I24.89 OTHER FORMS OF ACUTE ISCHEMIC HEART DISEASE: ICD-10-CM

## 2024-05-17 DIAGNOSIS — I10 ESSENTIAL (PRIMARY) HYPERTENSION: ICD-10-CM

## 2024-05-17 DIAGNOSIS — N39.0 URINARY TRACT INFECTION, SITE NOT SPECIFIED: ICD-10-CM

## 2024-06-17 ENCOUNTER — EMERGENCY (EMERGENCY)
Facility: HOSPITAL | Age: 89
LOS: 1 days | Discharge: HOME | End: 2024-06-17
Attending: EMERGENCY MEDICINE | Admitting: EMERGENCY MEDICINE
Payer: MEDICARE

## 2024-06-17 VITALS
OXYGEN SATURATION: 96 % | RESPIRATION RATE: 18 BRPM | SYSTOLIC BLOOD PRESSURE: 124 MMHG | DIASTOLIC BLOOD PRESSURE: 74 MMHG | TEMPERATURE: 97 F | HEART RATE: 65 BPM

## 2024-06-17 DIAGNOSIS — E78.5 HYPERLIPIDEMIA, UNSPECIFIED: ICD-10-CM

## 2024-06-17 DIAGNOSIS — S50.811A ABRASION OF RIGHT FOREARM, INITIAL ENCOUNTER: ICD-10-CM

## 2024-06-17 DIAGNOSIS — F03.A0 UNSPECIFIED DEMENTIA, MILD, WITHOUT BEHAVIORAL DISTURBANCE, PSYCHOTIC DISTURBANCE, MOOD DISTURBANCE, AND ANXIETY: ICD-10-CM

## 2024-06-17 DIAGNOSIS — I10 ESSENTIAL (PRIMARY) HYPERTENSION: ICD-10-CM
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
## 2024-06-17 DIAGNOSIS — Z23 ENCOUNTER FOR IMMUNIZATION: ICD-10-CM
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
## 2024-06-17 DIAGNOSIS — E03.9 HYPOTHYROIDISM, UNSPECIFIED: ICD-10-CM

## 2024-06-17 PROCEDURE — 73070 X-RAY EXAM OF ELBOW: CPT | Mod: 26,RT

## 2024-06-17 PROCEDURE — 70450 CT HEAD/BRAIN W/O DYE: CPT | Mod: 26,MC

## 2024-06-17 PROCEDURE — 99285 EMERGENCY DEPT VISIT HI MDM: CPT

## 2024-06-17 PROCEDURE — 73521 X-RAY EXAM HIPS BI 2 VIEWS: CPT | Mod: 26

## 2024-06-17 NOTE — ED ADULT TRIAGE NOTE - ARRIVAL INFO ADDITIONAL COMMENTS
pt states she was at a cabinet and while closing the door lost her balance and fell, skin tear to right elbow, denies pain or dizziness.   no loc or blood thinners.   oriented x3.   ambulatory afterwards.  HHA with pt when she fell.

## 2024-06-17 NOTE — ED ADULT TRIAGE NOTE - TEMPERATURE IN FAHRENHEIT (DEGREES F)
aox3 , off to Radiology for ct of the head w/o contrast at this time , assisted by ED transporter . will reevaluate on her return
97.3

## 2024-06-18 VITALS
SYSTOLIC BLOOD PRESSURE: 160 MMHG | TEMPERATURE: 98 F | OXYGEN SATURATION: 97 % | DIASTOLIC BLOOD PRESSURE: 79 MMHG | RESPIRATION RATE: 18 BRPM | HEART RATE: 62 BPM

## 2024-06-18 PROBLEM — I10 ESSENTIAL (PRIMARY) HYPERTENSION: Chronic | Status: ACTIVE | Noted: 2024-05-06

## 2024-06-18 PROBLEM — E03.9 HYPOTHYROIDISM, UNSPECIFIED: Chronic | Status: ACTIVE | Noted: 2024-05-06

## 2024-06-18 PROBLEM — F03.90 UNSPECIFIED DEMENTIA WITHOUT BEHAVIORAL DISTURBANCE: Chronic | Status: ACTIVE | Noted: 2024-05-06

## 2024-06-18 PROBLEM — E78.5 HYPERLIPIDEMIA, UNSPECIFIED: Chronic | Status: ACTIVE | Noted: 2024-05-06

## 2024-06-18 PROCEDURE — 90715 TDAP VACCINE 7 YRS/> IM: CPT

## 2024-06-18 PROCEDURE — 90471 IMMUNIZATION ADMIN: CPT

## 2024-06-18 PROCEDURE — 73070 X-RAY EXAM OF ELBOW: CPT

## 2024-06-18 PROCEDURE — 99284 EMERGENCY DEPT VISIT MOD MDM: CPT | Mod: 25

## 2024-06-18 PROCEDURE — 73060 X-RAY EXAM OF HUMERUS: CPT

## 2024-06-18 PROCEDURE — 73060 X-RAY EXAM OF HUMERUS: CPT | Mod: 26,RT

## 2024-06-18 PROCEDURE — 70450 CT HEAD/BRAIN W/O DYE: CPT | Mod: MC

## 2024-06-18 PROCEDURE — 73521 X-RAY EXAM HIPS BI 2 VIEWS: CPT

## 2024-06-18 RX ORDER — TETANUS TOXOID, REDUCED DIPHTHERIA TOXOID AND ACELLULAR PERTUSSIS VACCINE, ADSORBED 5; 2.5; 8; 8; 2.5 [IU]/.5ML; [IU]/.5ML; UG/.5ML; UG/.5ML; UG/.5ML
0.5 SUSPENSION INTRAMUSCULAR ONCE
Refills: 0 | Status: COMPLETED | OUTPATIENT
Start: 2024-06-18 | End: 2024-06-18

## 2024-06-18 RX ADMIN — TETANUS TOXOID, REDUCED DIPHTHERIA TOXOID AND ACELLULAR PERTUSSIS VACCINE, ADSORBED 0.5 MILLILITER(S): 5; 2.5; 8; 8; 2.5 SUSPENSION INTRAMUSCULAR at 02:08

## 2024-06-18 NOTE — ED PROVIDER NOTE - CLINICAL SUMMARY MEDICAL DECISION MAKING FREE TEXT BOX
89-year-old female with mechanical trip and fall is well-appearing has no lightheaded dizziness chest pain aide was with her the whole time no head injury however given atrial plan for CT head x-ray pelvis with hips as well as x-ray elbow and humerus patient is otherwise well-appearing on exam is ANO x 4 has no medical complaints otherwise denies dizziness     plan for Tdap update x-rays CT head reassessment will ambulate walks with a walker at baseline

## 2024-06-18 NOTE — ED PROVIDER NOTE - WR ORDER ID 2
Do You Have A Family History Of Psoriasis?: yes
Have You Been Diagnosed With Psoriatic Arthritis?: no
Where Is Your Psoriasis Located?: scalp
[id number]

## 2024-06-18 NOTE — ED PROVIDER NOTE - NSFOLLOWUPINSTRUCTIONS_ED_ALL_ED_FT
You have a skin tear on your right upper arm please make sure to keep the area clean and apply topical antibiotic twice daily to the area if you see that the area is turning red hard and swollen this is a sign that it is infected and you need to be evaluated emergently.  If you feel lightheaded dizzy or have any additional falls call 911.  Please get yourself a life alert button  and only walk with your walker, make sure you have your aide at your side when walking.      Fall Prevention in the Home, Adult  Falls can cause injuries and can happen to people of all ages. There are many things you can do to make your home safer and to help prevent falls.    What actions can I take to prevent falls?  General information    Use good lighting in all rooms. Make sure to:  Replace any light bulbs that burn out.  Turn on the lights in dark areas and use night-lights.  Keep items that you use often in easy-to-reach places. Lower the shelves around your home if needed.  Move furniture so that there are clear paths around it.  Do not use throw rugs or other things on the floor that can make you trip.  If any of your floors are uneven, fix them.  Add color or contrast paint or tape to clearly endy and help you see:  Grab bars or handrails.  First and last steps of staircases.  Where the edge of each step is.  If you use a ladder or stepladder:  Make sure that it is fully opened. Do not climb a closed ladder.  Make sure the sides of the ladder are locked in place.  Have someone hold the ladder while you use it.  Know where your pets are as you move through your home.  What can I do in the bathroom?    A grab bar next to a toilet.  Shower and bathtub, showing safety grab bars on the walls.  Keep the floor dry. Clean up any water on the floor right away.  Remove soap buildup in the bathtub or shower. Buildup makes bathtubs and showers slippery.  Use non-skid mats or decals on the floor of the bathtub or shower.  Attach bath mats securely with double-sided, non-slip rug tape.  If you need to sit down in the shower, use a non-slip stool.  Install grab bars by the toilet and in the bathtub and shower. Do not use towel bars as grab bars.  What can I do in the bedroom?    Make sure that you have a light by your bed that is easy to reach.  Do not use any sheets or blankets on your bed that hang to the floor.  Have a firm chair or bench with side arms that you can use for support when you get dressed.  What can I do in the kitchen?    Clean up any spills right away.  If you need to reach something above you, use a step stool with a grab bar.  Keep electrical cords out of the way.  Do not use floor polish or wax that makes floors slippery.  What can I do with my stairs?    Do not leave anything on the stairs.  Make sure that you have a light switch at the top and the bottom of the stairs.  Make sure that there are handrails on both sides of the stairs. Fix handrails that are broken or loose.  Install non-slip stair treads on all your stairs if they do not have carpet.  Avoid having throw rugs at the top or bottom of the stairs.  Choose a carpet that does not hide the edge of the steps on the stairs. Make sure that the carpet is firmly attached to the stairs. Fix carpet that is loose or worn.  What can I do on the outside of my home?    Use bright outdoor lighting.  Fix the edges of walkways and driveways and fix any cracks. Clear paths of anything that can make you trip, such as tools or rocks.  Add color or contrast paint or tape to clearly endy and help you see anything that might make you trip as you walk through a door, such as a raised step or threshold.  Trim any bushes or trees on paths to your home.  Check to see if handrails are loose or broken and that both sides of all steps have handrails. Install guardrails along the edges of any raised decks and porches.  Have leaves, snow, or ice cleared regularly. Use sand, salt, or ice melter on paths if you live where there is ice and snow during the winter.  Clean up any spills in your garage right away. This includes grease or oil spills.  What other actions can I take?    Review your medicines with your doctor. Some medicines can cause dizziness or changes in blood pressure, which increase your risk of falling.  Wear shoes that:  Have a low heel. Do not wear high heels.  Have rubber bottoms and are closed at the toe.  Feel good on your feet and fit well.  Use tools that help you move around if needed. These include:  Canes.  Walkers.  Scooters.  Crutches.  Ask your doctor what else you can do to help prevent falls. This may include seeing a physical therapist to learn to do exercises to move better and get stronger.  Where to find more information  Centers for Disease Control and PreventionPARTH: cdc.gov  National [place] on Aging: katheryn.nih.gov  National [place] on Aging: katheryn.nih.gov  Contact a doctor if:  You are afraid of falling at home.  You feel weak, drowsy, or dizzy at home.  You fall at home.  Get help right away if you:  Lose consciousness or have trouble moving after a fall.  Have a fall that causes a head injury.  These symptoms may be an emergency. Get help right away. Call 911.  Do not wait to see if the symptoms will go away.  Do not drive yourself to the hospital.  This information is not intended to replace advice given to you by your health care provider. Make sure you discuss any questions you have with your health care provider.    Document Revised: 08/21/2023 Document Reviewed: 08/21/2023

## 2024-06-18 NOTE — ED PROVIDER NOTE - PROGRESS NOTE DETAILS
CT head negative x-ray hips without fracture x-ray elbow and humerus negative will update Tdap wrapped her skin ambulated pt w/ assistance which is her baseline, will dc  to home w/ her home health aid who is at the bedside with her

## 2024-06-18 NOTE — ED PROVIDER NOTE - PHYSICAL EXAMINATION
VITAL SIGNS: I have reviewed nursing notes and confirm.  CONSTITUTIONAL: Well appearing, in no acute distress. GCS 15 well-appearing  SKIN:  warm and dry, no acute rash.   HEAD:  normocephalic, atraumatic.  EYES: EOM intact; conjunctiva and sclera clear.  ENT: No nasal discharge; airway clear.   NECK: Supple.  CARD: S1, S2, Regular rate and rhythm.   RESP:  Clear to auscultation b/l, no wheezes, rales or rhonchi.  ABD: Normal bowel sounds; soft; non-distended; non-tender; no guarding/ rebound.  EXT: Normal ROM. No peripheral edema. Pulses intact and equal b/l. moving all extremities 5+ strength upper and lower extremities bilaterally skin tear to right posterior lateral humerus mild  soft tissue tenderness no crepitus no step-offs no bony tenderness no wrist drop no foot drop  NEURO: Alert, oriented, grossly unremarkable  PSYCH: Cooperative, mood and affect appropriate.

## 2024-06-18 NOTE — ED PROVIDER NOTE - IN ACCORDANCE WITH [place] STATE LAW, WE OFFER EVERY PATIENT A HEPATITIS C TEST. WOULD YOU LIKE TO BE TESTED TODAY?
Opt out I personally performed the service described in the documentation recorded by the scribe in my presence, and it accurately and completely records my words and actions.

## 2024-06-18 NOTE — ED PROVIDER NOTE - PATIENT PORTAL LINK FT
You can access the FollowMyHealth Patient Portal offered by [facility] by registering at the following website: [email or web address]. By joining [employer]’s FollowMyHealth portal, you will also be able to view your health information using other applications (apps) compatible with our system.

## 2024-06-18 NOTE — ED ADULT NURSE NOTE - NSFALLRISKINTERV_ED_ALL_ED

## 2024-06-18 NOTE — ED PROVIDER NOTE - OBJECTIVE STATEMENT
89F w/ PMHx of Mild Dementia c/b frequent falls, hypothyroidism, HTN, HLD Here with her home health aide after trip and fall losing her balance.  Patient was reaching into a cabinet and lost her balance and fell was unable to stand, EMS was called and arrived on scene, patient was stood up and was able to stand, denies head injury or LOC.  Does not take anticoagulation.  Does not have any hip pain.  Does have some right arm pain where she incurred a skin tear.

## 2024-06-18 NOTE — ED PROVIDER NOTE - CARE PLAN
1 Principal Discharge DX:	Fall  Secondary Diagnosis:	Skin tear of upper arm without complication, right, initial encounter

## 2024-06-18 NOTE — ED ADULT NURSE NOTE - OBJECTIVE STATEMENT
Pt presents to ED c/o fall. Pt states she was at a cabinet and while closing the door lost her balance and fell, skin tear to right elbow, denies pain or dizziness.   no loc or blood thinners.   oriented x3.   ambulatory afterwards.  HHA with pt when she fell. Pt presents to ED c/o fall. Pt states she was at a cabinet and while closing the door, lost her balance and fell, skin tear to right elbow. Denies pain or dizziness, loc or blood thinners.   A&Ox4.   ambulatory afterwards.  HHA with pt when she fell. Pt with PMH Mild Dementia c/b frequent falls, hypothyroidism, HTN, HLD  presents to ED with HHA c/o fall. Pt states she was reaching in a cabinet and while closing the door, lost her balance and fell, skin tear to right elbow. Denies pain or dizziness, loc, headstrike or blood thinners.   A&Ox4.   ambulatory afterwards.

## 2025-03-11 NOTE — H&P ADULT - VTE RISK ASSESSMENT
[FreeTextEntry1] : I, JANELL VILLATORO, acted solely as a scribe for Dr. Asher Watt on this date 03/11/2025.  All medical record entries made by the Scribe were at my, Dr. Asher Watt, direction and personally dictated by me on 03/11/2025. I have reviewed the chart and agree that the record accurately reflects my personal performance of the history, physical exam, assessment and plan. I have also personally directed, reviewed, and agreed with the chart.								 
VTE Assessment already completed for this visit